# Patient Record
Sex: FEMALE | Race: OTHER | NOT HISPANIC OR LATINO | Employment: OTHER | URBAN - METROPOLITAN AREA
[De-identification: names, ages, dates, MRNs, and addresses within clinical notes are randomized per-mention and may not be internally consistent; named-entity substitution may affect disease eponyms.]

---

## 2017-09-01 DIAGNOSIS — Z12.31 ENCOUNTER FOR SCREENING MAMMOGRAM FOR MALIGNANT NEOPLASM OF BREAST: ICD-10-CM

## 2017-10-23 ENCOUNTER — HOSPITAL ENCOUNTER (OUTPATIENT)
Dept: MAMMOGRAPHY | Facility: HOSPITAL | Age: 77
Discharge: HOME | End: 2017-10-23
Attending: OBSTETRICS & GYNECOLOGY
Payer: MEDICARE

## 2017-10-23 DIAGNOSIS — Z12.31 ENCOUNTER FOR SCREENING MAMMOGRAM FOR MALIGNANT NEOPLASM OF BREAST: ICD-10-CM

## 2017-10-23 PROCEDURE — G0202 SCR MAMMO BI INCL CAD: HCPCS

## 2017-10-23 PROCEDURE — 77063 BREAST TOMOSYNTHESIS BI: CPT

## 2017-10-24 DIAGNOSIS — R92.8 OTHER ABNORMAL AND INCONCLUSIVE FINDINGS ON DIAGNOSTIC IMAGING OF BREAST: ICD-10-CM

## 2017-10-31 ENCOUNTER — HOSPITAL ENCOUNTER (OUTPATIENT)
Dept: ULTRASOUND IMAGING | Facility: CLINIC | Age: 77
Discharge: HOME | End: 2017-10-31
Payer: MEDICARE

## 2017-10-31 ENCOUNTER — HOSPITAL ENCOUNTER (OUTPATIENT)
Dept: MAMMOGRAPHY | Facility: CLINIC | Age: 77
Discharge: HOME | End: 2017-10-31
Payer: MEDICARE

## 2017-10-31 DIAGNOSIS — R92.8 OTHER ABNORMAL AND INCONCLUSIVE FINDINGS ON DIAGNOSTIC IMAGING OF BREAST: ICD-10-CM

## 2017-10-31 PROCEDURE — 76642 ULTRASOUND BREAST LIMITED: CPT

## 2018-03-14 ENCOUNTER — TRANSCRIBE ORDERS (OUTPATIENT)
Dept: ADMINISTRATIVE | Facility: HOSPITAL | Age: 78
End: 2018-03-14

## 2018-03-14 DIAGNOSIS — R20.0 NUMBNESS OF TOES: Primary | ICD-10-CM

## 2018-04-16 ENCOUNTER — HOSPITAL ENCOUNTER (OUTPATIENT)
Dept: RADIOLOGY | Facility: CLINIC | Age: 78
Discharge: HOME | End: 2018-04-16

## 2018-06-18 ENCOUNTER — HOSPITAL ENCOUNTER (OUTPATIENT)
Dept: RADIOLOGY | Facility: CLINIC | Age: 78
Discharge: HOME | End: 2018-06-18
Admitting: PHYSICAL MEDICINE & REHABILITATION
Payer: MEDICARE

## 2018-06-18 DIAGNOSIS — R20.0 NUMBNESS OF TOES: ICD-10-CM

## 2018-06-18 PROCEDURE — 95886 MUSC TEST DONE W/N TEST COMP: CPT

## 2018-06-18 PROCEDURE — 95911 NRV CNDJ TEST 9-10 STUDIES: CPT | Performed by: PHYSICAL MEDICINE & REHABILITATION

## 2018-06-18 PROCEDURE — 95886 MUSC TEST DONE W/N TEST COMP: CPT | Performed by: PHYSICAL MEDICINE & REHABILITATION

## 2018-06-18 NOTE — PROCEDURES
Procedures      Electromyogram and Nerve Conduction Velocity Procedure Note    HX:  79-year-old female with numbness in both feet referred by podiatry for electrodiagnostic study  She has a history of hyperglycemia but takes no medications  She also has a history of flat feet and has worn orthotics in the past   She also reports a history of plantar fasciitis  She does have some chronic back pain but describes no radicular symptoms  PMH:   Hyperglycemia no history of cancer no thyroid disorder  Exam:   Her Achilles reflexes are absent  Patellar reflexes are symmetric  There is no focal or lateralizing motor findings  There is no long tract signs, no fasciculations, tremors or atrophy  Tinel sign is unremarkable of both tarsal tunnels  There is mildly diminished sensation in the distal forefoot on both dorsal and plantar surface  Procedure:  Verbal informed consent was obtained as with all electrodiagnostic medicine patients  As with all patients this patient was informed that they may terminate the  examine at any time  Patient tolerated the procedure well with no adverse effects reported or observed  Findings:  Please see the [employer] data printout  Both peroneal motor responses were reduced amplitude  Both distal tibial motor latencies were prolonged and tarsal tunnel region right greater than left  Both medial plantar mixed responses were absent  Both sural sensory responses were present  The left sural sensory response was reduced in amplitude the right was normal     Both tibial H reflexes were obtained with significant relative slowing of the left side compared to the right  Electromyography:  monopolar needle exploration revealed mildly increased insertional activity in the form of trains of positive waves in the left lumbar paraspinal muscles only    No abnormalities found the following muscles:  Right lumbar paraspinals, bilateral gluteus medius, bilateral vastus lateralis, bilateral biceps femoris-long head, bilateral tibialis anterior, bilateral gastrocnemius  In all these muscles motor units were normal for amplitude duration and configuration  Recruitment pattern normal as well  Conclusion:   1  There is evidence for focal slowing of the distal tibial nerve branches in the tarsal tunnel with absent medial plantar sensory responses in the face of preserved sural sensory responses  This is most consistent with a bilateral tarsal tunnel syndrome which is moderate  2   There is also evidence for a very mild large fiber polyneuropathy which is not uncommon with hyperglycemic states  Other etiologies of course cannot be excluded entirely  The unilateral left tibial H-reflex prolongation is of uncertain significance  He can represent mild root irritation of the S1 nerve root but there is no evidence in the limb muscles to further define an  acute radiculopathy  Recommendations:     Careful clinical correlation is advised

## 2018-07-02 ENCOUNTER — ANNUAL EXAM (OUTPATIENT)
Dept: OBGYN CLINIC | Facility: CLINIC | Age: 78
End: 2018-07-02
Payer: MEDICARE

## 2018-07-02 VITALS
DIASTOLIC BLOOD PRESSURE: 70 MMHG | WEIGHT: 159 LBS | SYSTOLIC BLOOD PRESSURE: 122 MMHG | HEIGHT: 66 IN | BODY MASS INDEX: 25.55 KG/M2

## 2018-07-02 DIAGNOSIS — N95.1 MENOPAUSAL SYMPTOMS: ICD-10-CM

## 2018-07-02 DIAGNOSIS — Z01.419 GYNECOLOGIC EXAM NORMAL: Primary | ICD-10-CM

## 2018-07-02 DIAGNOSIS — Z12.39 SCREENING FOR MALIGNANT NEOPLASM OF BREAST: ICD-10-CM

## 2018-07-02 PROCEDURE — G0101 CA SCREEN;PELVIC/BREAST EXAM: HCPCS | Performed by: OBSTETRICS & GYNECOLOGY

## 2018-07-02 RX ORDER — UBIDECARENONE 10 MG
10 CAPSULE ORAL
COMMUNITY

## 2018-07-02 NOTE — PROGRESS NOTES
Grecia Fung is a 68 y o   female who presents for annual well woman exam  Periods are absent  No bleeding, spotting, or discharge  Patient reports Yes  hot flashes/night sweats, not sexually active at this time  had a mild stroke in 2017 and during his recovery he fell and broke his humerus and had to have his shoulder replaced, he is also awaiting knee replacement she has been taking care of him and helping him in doing a lot around the house and yd  Discussed reviewed for her to be cautious and consider getting some help as she has strain some muscles  , No vaginal dryness, sleeping poorly same as prior  Current contraception: post menopausal status  History of abnormal Pap smear: no  Family history of uterine or ovarian cancer: no  Regular self breast exam: yes  History of abnormal mammogram: yes -has stable cyst on ultrasound bilobed  Family history of breast cancer: yes -sister and paternal aunt    Menstrual History:  OB History      Para Term  AB Living    1 1       1    SAB TAB Ectopic Multiple Live Births                      Menarche age:   No LMP recorded (lmp unknown)  The following portions of the patient's history were reviewed and updated as appropriate: allergies, current medications, past family history, past medical history, past social history, past surgical history and problem list   Past Medical History:   Diagnosis Date    Arthritis     Fibrocystic breast     Hx of lithotripsy     Hyperthyroidism     Nocturia     Osteopenia     Osteoporosis      Past Surgical History:   Procedure Laterality Date    BREAST BIOPSY Right     FOOT SURGERY      LITHOTRIPSY      renal     OB History      Para Term  AB Living    1 1       1    SAB TAB Ectopic Multiple Live Births                       Review of Systems  Review of Systems   Constitutional: Positive for fatigue   Negative for chills, fever and unexpected weight change  HENT: Negative for dental problem, sinus pain and sinus pressure  Eyes: Negative for visual disturbance  Respiratory: Negative for cough, shortness of breath and wheezing  Cardiovascular: Negative for chest pain and leg swelling  Gastrointestinal: Negative for constipation, diarrhea, nausea and vomiting  Endocrine: Positive for heat intolerance  Genitourinary: Negative for menstrual problem, pelvic pain and urgency  Musculoskeletal: Positive for back pain  Arthritis     Allergic/Immunologic: Negative for environmental allergies  Neurological: Negative for dizziness and headaches  Objective      LMP  (LMP Unknown)    Vitals:    18 1415   BP: 122/70         General:   alert and oriented, in no acute distress, alert, appears stated age and cooperative   Heart: regular rate and rhythm, S1, S2 normal, no murmur, click, rub or gallop   Lungs: clear to auscultation bilaterally   Abdomen: soft, non-tender, without masses or organomegaly   Vulva: normal   Vagina: normal mucosa   Cervix: no cervical motion tenderness   Uterus: normal size, mobile, non-tender, Small   Adnexa: normal adnexa and no mass, fullness, tenderness   Breast inspection negative, no nipple discharge or bleeding, no masses or nodularity palpable  Rectal negative, stool guaiac negative  Pupils equal round reactive to light and accommodation  Throat clear no lesions or findings  Thyroid normal no masses or nodules       Assessment   68year-old  here for annual exam   Patient does not require Pap smears  Plan   Continue yearly mammogram, return in 1-2 years or sooner as needed

## 2018-08-21 ENCOUNTER — OFFICE VISIT (OUTPATIENT)
Dept: SURGERY | Facility: CLINIC | Age: 78
End: 2018-08-21
Payer: MEDICARE

## 2018-08-21 VITALS
HEIGHT: 66 IN | BODY MASS INDEX: 24.59 KG/M2 | RESPIRATION RATE: 13 BRPM | TEMPERATURE: 98.1 F | HEART RATE: 61 BPM | WEIGHT: 153 LBS | DIASTOLIC BLOOD PRESSURE: 64 MMHG | SYSTOLIC BLOOD PRESSURE: 132 MMHG

## 2018-08-21 DIAGNOSIS — D12.3 ADENOMATOUS POLYP OF TRANSVERSE COLON: Primary | ICD-10-CM

## 2018-08-21 PROCEDURE — 99203 OFFICE O/P NEW LOW 30 MIN: CPT | Performed by: PHYSICIAN ASSISTANT

## 2018-08-21 NOTE — PROGRESS NOTES
Jaylene Leo 1940 female MRN: [id number]        SUBJECTIVE    CC: Screening Colonoscopy (Colonoscopy consult- Last one done by Doc)      HPI:  Jaylene Leo is a 68 y o  female who presented for screening colonoscopy  This is going to be her 3rd colonoscopy  She is doing well, denies abdominal pain, n/v/ changes in bowel habits, fever or chills  She has never had an abdominal surgery  She denies Heart, lung or kidney disease  HPI    Review of Systems   Constitutional: Negative for activity change, chills and fever  Respiratory: Negative for cough and shortness of breath  Cardiovascular: Negative for chest pain and palpitations  Gastrointestinal: Negative for abdominal pain, blood in stool, constipation, diarrhea, nausea and vomiting  Genitourinary: Negative for difficulty urinating  Musculoskeletal: Negative for arthralgias  Neurological: Negative for headaches         Historical Information     The patient history was reviewed as follows:    Past Medical History:   Diagnosis Date    Arthritis     Fibrocystic breast     Hx of lithotripsy     Hyperthyroidism     Nocturia     Osteopenia     Osteoporosis      Past Surgical History:   Procedure Laterality Date    BREAST BIOPSY Right     FOOT SURGERY      LITHOTRIPSY      renal     Family History   Problem Relation Age of Onset    Cerebral aneurysm Mother     Breast cancer Sister     Cancer Brother         gastric,prostate    Breast cancer Paternal Aunt       Social History   History   Alcohol Use No     History   Drug Use No     History   Smoking Status    Never Smoker   Smokeless Tobacco    Never Used       Medications:   Meds/Allergies     Current Outpatient Prescriptions:     cholecalciferol (VITAMIN D3) 1,000 units tablet, Take 1,000 Units by mouth, Disp: , Rfl:     Coenzyme Q10 10 MG capsule, Take 10 mg by mouth, Disp: , Rfl:     Cyanocobalamin (VITAMIN B 12) 100 MCG LOZG, Take 1 tablet by mouth, Disp: , Rfl:    Efinaconazole (JUBLIA) 10 % SOLN, APPLY TO AFFECTED TOENAIL(S) BY TOPICAL ROUTE ONCE DAILY, Disp: , Rfl:     estradiol-norethindrone (COMBIPATCH) 0 05-0 14 MG/DAY, Place 1 patch on the skin 2 (two) times a week, Disp: 8 patch, Rfl: 3    Glucosamine-Chondroit-Calcium (TRIPLE FLEX BONE & JOINT PO), Take by mouth, Disp: , Rfl:     Multiple Vitamins-Minerals (MULTIVITAMIN ADULT EXTRA C PO), Take 1 tablet by mouth, Disp: , Rfl:     Omega-3 Fatty Acids (FISH OIL PO), Take 2 g by mouth, Disp: , Rfl:     Probiotic Product (PROBIOTIC-10 PO), Take by mouth, Disp: , Rfl:   Allergies   Allergen Reactions    Nitroglycerin Anaphylaxis       OBJECTIVE    Vitals:   Vitals:    08/21/18 1047   BP: 132/64   BP Location: Right arm   Patient Position: Sitting   Cuff Size: Standard   Pulse: 61   Resp: 13   Temp: 98 1 °F (36 7 °C)   TempSrc: Tympanic   Weight: 69 4 kg (153 lb)   Height: 5' 6" (1 676 m)     Wt Readings from Last 3 Encounters:   08/21/18 69 4 kg (153 lb)   07/02/18 72 1 kg (159 lb)   05/09/16 72 6 kg (160 lb)     Body mass index is 24 69 kg/m²  Temp Readings from Last 3 Encounters:   08/21/18 98 1 °F (36 7 °C) (Tympanic)     BP Readings from Last 3 Encounters:   08/21/18 132/64   07/02/18 122/70   05/09/16 140/70     Pulse Readings from Last 3 Encounters:   08/21/18 61     No LMP recorded  Physical Exam:    Physical Exam   Constitutional: She is oriented to person, place, and time  She appears well-developed and well-nourished  HENT:   Head: Normocephalic and atraumatic  Eyes: Conjunctivae are normal    Neck: Normal range of motion  Neck supple  Cardiovascular: Normal rate, regular rhythm, normal heart sounds and intact distal pulses  Exam reveals no gallop and no friction rub  No murmur heard  Pulmonary/Chest: Effort normal and breath sounds normal  No respiratory distress  She has no wheezes  She has no rales  Abdominal: Soft  Bowel sounds are normal  She exhibits no distension   There is no tenderness  There is no rebound  Musculoskeletal: Normal range of motion  Neurological: She is alert and oriented to person, place, and time  Skin: Skin is warm and dry  Psychiatric: She has a normal mood and affect  Vitals reviewed  Labs: I have personally reviewed all pertinent results  No visits with results within 3 Month(s) from this visit  Latest known visit with results is:   No results found for any previous visit  No results found for any previous visit  Imaging:  I have personally reviewed all pertinent results  Assessment/Plan   Patient here for screening colonoscopy      - PCP: Natasha Li MD  - Follow-up appointments:     Future Appointments  Date Time Provider Manan Lopez   11/5/2018 10:00 AM AN MAMMO 1 HEAVEN Uriarte MD, PGY-1  [facility]   8/21/2018

## 2018-08-21 NOTE — LETTER
August 21, 2018     Toan Bravo MD  [address] [place] [address]    Patient: Rohit Nowak   YOB: 1940   Date of Visit: 8/21/2018       Dear Dr Jeremy Perez:    Thank you for referring Shannon Perez to me for evaluation  Below are my notes for this consultation  If you have questions, please do not hesitate to call me  I look forward to following your patient along with you  Sincerely,        Madelaine Prieto PA-C        CC: No Recipients  Theresa Pardo MD  8/21/2018 12:06 PM  Attested  Rohit Nowak 1940 female MRN: [id number]        SUBJECTIVE    CC: Screening Colonoscopy (Colonoscopy consult- Last one done by Doc)      HPI:  Rohit Nowak is a 68 y o  female who presented for screening colonoscopy  This is going to be her 3rd colonoscopy  She is doing well, denies abdominal pain, n/v/ changes in bowel habits, fever or chills  She has never had an abdominal surgery  She denies Heart, lung or kidney disease  HPI    Review of Systems   Constitutional: Negative for activity change, chills and fever  Respiratory: Negative for cough and shortness of breath  Cardiovascular: Negative for chest pain and palpitations  Gastrointestinal: Negative for abdominal pain, blood in stool, constipation, diarrhea, nausea and vomiting  Genitourinary: Negative for difficulty urinating  Musculoskeletal: Negative for arthralgias  Neurological: Negative for headaches         Historical Information     The patient history was reviewed as follows:    Past Medical History:   Diagnosis Date    Arthritis     Fibrocystic breast     Hx of lithotripsy     Hyperthyroidism     Nocturia     Osteopenia     Osteoporosis      Past Surgical History:   Procedure Laterality Date    BREAST BIOPSY Right     FOOT SURGERY      LITHOTRIPSY      renal     Family History   Problem Relation Age of Onset    Cerebral aneurysm Mother     Breast cancer Sister     Cancer Brother         gastric,prostate    Breast cancer Paternal Aunt       Social History   History   Alcohol Use No     History   Drug Use No     History   Smoking Status    Never Smoker   Smokeless Tobacco    Never Used       Medications:   Meds/Allergies     Current Outpatient Prescriptions:     cholecalciferol (VITAMIN D3) 1,000 units tablet, Take 1,000 Units by mouth, Disp: , Rfl:     Coenzyme Q10 10 MG capsule, Take 10 mg by mouth, Disp: , Rfl:     Cyanocobalamin (VITAMIN B 12) 100 MCG LOZG, Take 1 tablet by mouth, Disp: , Rfl:     Efinaconazole (JUBLIA) 10 % SOLN, APPLY TO AFFECTED TOENAIL(S) BY TOPICAL ROUTE ONCE DAILY, Disp: , Rfl:     estradiol-norethindrone (COMBIPATCH) 0 05-0 14 MG/DAY, Place 1 patch on the skin 2 (two) times a week, Disp: 8 patch, Rfl: 3    Glucosamine-Chondroit-Calcium (TRIPLE FLEX BONE & JOINT PO), Take by mouth, Disp: , Rfl:     Multiple Vitamins-Minerals (MULTIVITAMIN ADULT EXTRA C PO), Take 1 tablet by mouth, Disp: , Rfl:     Omega-3 Fatty Acids (FISH OIL PO), Take 2 g by mouth, Disp: , Rfl:     Probiotic Product (PROBIOTIC-10 PO), Take by mouth, Disp: , Rfl:   Allergies   Allergen Reactions    Nitroglycerin Anaphylaxis       OBJECTIVE    Vitals:   Vitals:    08/21/18 1047   BP: 132/64   BP Location: Right arm   Patient Position: Sitting   Cuff Size: Standard   Pulse: 61   Resp: 13   Temp: 98 1 °F (36 7 °C)   TempSrc: Tympanic   Weight: 69 4 kg (153 lb)   Height: 5' 6" (1 676 m)     Wt Readings from Last 3 Encounters:   08/21/18 69 4 kg (153 lb)   07/02/18 72 1 kg (159 lb)   05/09/16 72 6 kg (160 lb)     Body mass index is 24 69 kg/m²  Temp Readings from Last 3 Encounters:   08/21/18 98 1 °F (36 7 °C) (Tympanic)     BP Readings from Last 3 Encounters:   08/21/18 132/64   07/02/18 122/70   05/09/16 140/70     Pulse Readings from Last 3 Encounters:   08/21/18 61     No LMP recorded      Physical Exam:    Physical Exam   Constitutional: She is oriented to person, place, and time  She appears well-developed and well-nourished  HENT:   Head: Normocephalic and atraumatic  Eyes: Conjunctivae are normal    Neck: Normal range of motion  Neck supple  Cardiovascular: Normal rate, regular rhythm, normal heart sounds and intact distal pulses  Exam reveals no gallop and no friction rub  No murmur heard  Pulmonary/Chest: Effort normal and breath sounds normal  No respiratory distress  She has no wheezes  She has no rales  Abdominal: Soft  Bowel sounds are normal  She exhibits no distension  There is no tenderness  There is no rebound  Musculoskeletal: Normal range of motion  Neurological: She is alert and oriented to person, place, and time  Skin: Skin is warm and dry  Psychiatric: She has a normal mood and affect  Vitals reviewed  Labs: I have personally reviewed all pertinent results  No visits with results within 3 Month(s) from this visit  Latest known visit with results is:   No results found for any previous visit  No results found for any previous visit  Imaging:  I have personally reviewed all pertinent results  Assessment/Plan   Patient here for screening colonoscopy  - PCP: Yun De La Fuente MD  - Follow-up appointments:     Future Appointments  Date Time Provider Manan Jesscia   11/5/2018 10:00 AM AN MAMMO 1 AN Karen Uriarte MD, PGY-1  [facility]   8/21/2018    Attestation signed by Anastasio Curling, PA-C at 8/21/2018 12:06 PM:  patient seen, interviewed and examined with resident    59-year-old female presents for surveillance colonoscopy  Patient had a colonoscopy in 2013 that was reported as normal and prior colonoscopy by Dr Jimbo Prajapati in 2010 with the tubular adenoma polyp    No family history of colon cancer    No denies abdominal pain, change in bowel habits or blood in stool    On physical exam  VSS  Heart:  Regular rate rhythm  Lungs:  Clear to auscultation, bilaterally   No rhonchi, wheezes or rales  Abdomen:  Soft, nontender, nondistended, positive bowel sounds  Extremities:  No cyanosis ,clubbing or edema    Assessment/ plan:  Colonoscopy for history of colon polyps and screening for colon cancer

## 2018-08-23 PROBLEM — Z86.010 HISTORY OF COLONIC POLYPS: Status: ACTIVE | Noted: 2018-08-23

## 2018-08-23 PROBLEM — Z86.0100 HISTORY OF COLONIC POLYPS: Status: ACTIVE | Noted: 2018-08-23

## 2018-09-17 ENCOUNTER — ANESTHESIA EVENT (OUTPATIENT)
Dept: GASTROENTEROLOGY | Facility: HOSPITAL | Age: 78
End: 2018-09-17
Payer: MEDICARE

## 2018-09-18 ENCOUNTER — ANESTHESIA (OUTPATIENT)
Dept: GASTROENTEROLOGY | Facility: HOSPITAL | Age: 78
End: 2018-09-18
Payer: MEDICARE

## 2018-09-18 ENCOUNTER — HOSPITAL ENCOUNTER (OUTPATIENT)
Facility: HOSPITAL | Age: 78
Setting detail: OUTPATIENT SURGERY
Discharge: HOME | End: 2018-09-18
Attending: SURGERY | Admitting: SURGERY
Payer: MEDICARE

## 2018-09-18 VITALS
HEIGHT: 66 IN | DIASTOLIC BLOOD PRESSURE: 54 MMHG | SYSTOLIC BLOOD PRESSURE: 108 MMHG | HEART RATE: 68 BPM | BODY MASS INDEX: 24.59 KG/M2 | OXYGEN SATURATION: 98 % | RESPIRATION RATE: 20 BRPM | WEIGHT: 153 LBS | TEMPERATURE: 99.1 F

## 2018-09-18 DIAGNOSIS — Z86.010 HISTORY OF COLONIC POLYPS: ICD-10-CM

## 2018-09-18 PROCEDURE — 88305 TISSUE EXAM BY PATHOLOGIST: CPT | Performed by: PATHOLOGY

## 2018-09-18 PROCEDURE — 45385 COLONOSCOPY W/LESION REMOVAL: CPT | Performed by: SURGERY

## 2018-09-18 PROCEDURE — 45384 COLONOSCOPY W/LESION REMOVAL: CPT | Performed by: SURGERY

## 2018-09-18 RX ORDER — LIDOCAINE HYDROCHLORIDE 10 MG/ML
INJECTION, SOLUTION INFILTRATION; PERINEURAL AS NEEDED
Status: DISCONTINUED | OUTPATIENT
Start: 2018-09-18 | End: 2018-09-18 | Stop reason: SURG

## 2018-09-18 RX ORDER — SODIUM CHLORIDE 9 MG/ML
125 INJECTION, SOLUTION INTRAVENOUS CONTINUOUS
Status: DISCONTINUED | OUTPATIENT
Start: 2018-09-18 | End: 2018-09-18 | Stop reason: HOSPADM

## 2018-09-18 RX ORDER — PROPOFOL 10 MG/ML
INJECTION, EMULSION INTRAVENOUS AS NEEDED
Status: DISCONTINUED | OUTPATIENT
Start: 2018-09-18 | End: 2018-09-18 | Stop reason: SURG

## 2018-09-18 RX ADMIN — PROPOFOL 50 MG: 10 INJECTION, EMULSION INTRAVENOUS at 07:40

## 2018-09-18 RX ADMIN — PROPOFOL 50 MG: 10 INJECTION, EMULSION INTRAVENOUS at 07:46

## 2018-09-18 RX ADMIN — LIDOCAINE HYDROCHLORIDE 30 MG: 10 INJECTION, SOLUTION INFILTRATION; PERINEURAL at 07:30

## 2018-09-18 RX ADMIN — PROPOFOL 50 MG: 10 INJECTION, EMULSION INTRAVENOUS at 07:30

## 2018-09-18 RX ADMIN — PROPOFOL 50 MG: 10 INJECTION, EMULSION INTRAVENOUS at 07:36

## 2018-09-18 RX ADMIN — SODIUM CHLORIDE 125 ML/HR: 0.9 INJECTION, SOLUTION INTRAVENOUS at 07:14

## 2018-09-18 NOTE — DISCHARGE INSTRUCTIONS
[facility]  Endoscopy Post-Operative Instructions  Dr Mike Kirby MD, FACS    Procedure: Colonoscopy    Findings:  Diverticulosis and Colon Polyp(s)    Follow-Up: You will need a repeat Endoscopy in (generally)5 years  Will await final pathology report for final determination of number of years until your follow up endoscopy, if you had polyps on this exam   Different types of polyps require different lengths of follow up surveillance  Please call our office or your primary doctor's office if you have any questions, once the report is returned  You should have an endoscopy sooner than recommended if you have any symptoms of bleeding or change in stools or other concerns  You will receive a call from our office with your results, in addition to the the preliminary results you received today  You will usually receive a follow-up letter from our office in 1-2 weeks  Call the office if you do not hear from us  You are welcome to also schedule an office visit if desired to discuss the results further  It is your responsibility to contact our office for results in 1- 2 weeks if you do not hear from us  If a follow up endoscopy is needed, you are responsible for arranging that follow up appointment at the appropriate time  The office may or may not issue a reminder at that future time  Please take responsibility for your own follow up healthcare  Diet: Eat a light snack first, and then resume your previous diet  Discharge Medications:  See after visit summary for reconciled discharge medications provided to patient and family  Current Facility-Administered Medications:     sodium chloride 0 9 % infusion, 125 mL/hr, Intravenous, Continuous, Dwayne Wilson DO, Last Rate: 125 mL/hr at 09/18/18 0714, 125 mL/hr at 09/18/18 0714  Do not take aspirin or blood thinning medications for 2 days following procedure  Tylenol is okay      You may have been given a new medication  Please take this (usually an anti-ulcer -type medication) and see your primary care doctor for refills and follow up medications if needed       After the test: Notify physician for arm swelling or pain at the intravenous site  You may have some abdominal discomfort following the procedure  Belching or passing flatus will help relieve it  Following upper endoscopy, you may experience a temporary sore throat  Saline gargles or lozenges can be taken to relieve sore throat Following lower endoscopy, minor rectal bleeding is not uncommon      Activity: Do not drive a car, operate machinery, or sign legal documents for 24 hours after your procedure  Normal activity may be resumed on the day following the procedure      Call the office at [phone] for any of the following: Severe abdominal pain, significant rectal bleeding, chills, or fever above 100°, new onset of persistent cough or persistent vomiting      [address], [address]   Phone: [phone]                        Colorectal Polyps   WHAT YOU NEED TO KNOW:   Colorectal polyps are small growths of tissue in the lining of the colon and rectum  Most polyps are hyperplastic polyps and are usually benign (noncancerous)  Certain types of polyps, called adenomatous polyps, may turn into cancer  DISCHARGE INSTRUCTIONS:   Follow up with your healthcare provider or gastroenterologist as directed: You may need to return for more tests, such as another colonoscopy  Write down your questions so you remember to ask them during your visits  Reduce your risk for colorectal polyps:   · Eat a variety of healthy foods:  Healthy foods include fruit, vegetables, whole-grain breads, low-fat dairy products, beans, lean meat, and fish  Ask if you need to be on a special diet  · Maintain a healthy weight:  Ask your healthcare provider if you need to lose weight and how much you need to lose   Ask for help with a weight loss program     · Exercise:  Begin to exercise slowly and do more as you get stronger  Talk with your healthcare provider before you start an exercise program      · Limit alcohol:  Your risk for polyps increases the more you drink  · Do not smoke: If you smoke, it is never too late to quit  Ask for information about how to stop  For support and more information:   · [address] ([facility])  [address] [place], [place] [address]  Phone: [phone]  Web Address: www digestive  niddk nih gov  Contact your healthcare provider or gastroenterologist if:   · You have a fever  · You have chills, a cough, or feel weak and achy  · You have abdominal pain that does not go away or gets worse after you take medicine  · Your abdomen is swollen  · You are losing weight without trying  · You have questions or concerns about your condition or care  Seek care immediately or call 911 if:   · You have sudden shortness of breath  · You have a fast heart rate, fast breathing, or are too dizzy to stand up  · You have severe abdominal pain  · You see blood in your bowel movement  © 2017 [address]  Information is for End User's use only and may not be sold, redistributed or otherwise used for commercial purposes  All illustrations and images included in CareNotes® are the copyrighted property of [employer]  or [address]  The above information is an  only  It is not intended as medical advice for individual conditions or treatments  Talk to your doctor, nurse or pharmacist before following any medical regimen to see if it is safe and effective for you  Colonoscopy   WHAT YOU NEED TO KNOW:   A colonoscopy is a procedure to examine the inside of your colon (intestine) with a scope  Polyps or tissue growths may have been removed during your colonoscopy   It is normal to feel bloated and to have some abdominal discomfort  You should be passing gas  If you have hemorrhoids or you had polyps removed, you may have a small amount of bleeding  DISCHARGE INSTRUCTIONS:   Seek care immediately if:   · You have a large amount of bright red blood in your bowel movements  · Your abdomen is hard and firm and you have severe pain  · You have sudden trouble breathing  Contact your healthcare provider if:   · You develop a rash or hives  · You have a fever within 24 hours of your procedure  · You have not had a bowel movement for 3 days after your procedure  · You have questions or concerns about your condition or care  Activity:   · Do not lift, strain, or run  for 3 days after your procedure  · Rest after your procedure  You have been given medicine to relax you  Do not  drive or make important decisions until the day after your procedure  Return to your normal activity as directed  · Relieve gas and discomfort from bloating  by lying on your right side with a heating pad on your abdomen  You may need to take short walks to help the gas move out  Eat small meals until bloating is relieved  If you had polyps removed: For 7 days after your procedure:  · Do not  take aspirin  · Do not  go on long car rides  Help prevent constipation:   · Eat a variety of healthy foods  Healthy foods include fruit, vegetables, whole-grain breads, low-fat dairy products, beans, lean meat, and fish  Ask if you need to be on a special diet  Your healthcare provider may recommend that you eat high-fiber foods such as cooked beans  Fiber helps you have regular bowel movements  · Drink liquids as directed  Adults should drink between 9 and 13 eight-ounce cups of liquid every day  Ask what amount is best for you  For most people, good liquids to drink are water, juice, and milk  · Exercise as directed  Talk to your healthcare provider about the best exercise plan for you   Exercise can help prevent constipation, decrease your blood pressure and improve your health  Follow up with your healthcare provider as directed:  Write down your questions so you remember to ask them during your visits  © 2017 [address] [employer] Information is for End User's use only and may not be sold, redistributed or otherwise used for commercial purposes  All illustrations and images included in CareNotes® are the copyrighted property of [employer]  or [employer]  The above information is an  only  It is not intended as medical advice for individual conditions or treatments  Talk to your doctor, nurse or pharmacist before following any medical regimen to see if it is safe and effective for you  Diverticulosis   WHAT YOU NEED TO KNOW:   Diverticulosis is a condition that causes small pockets called diverticula to form in your intestine  These pockets make it difficult for bowel movements to pass through your digestive system  DISCHARGE INSTRUCTIONS:   Seek care immediately if:   · You have severe pain on the left side of your lower abdomen  · Your bowel movements are bright or dark red  Contact your healthcare provider if:   · You have a fever and chills  · You feel dizzy or lightheaded  · You have nausea, or you are vomiting  · You have a change in your bowel movements  · You have questions or concerns about your condition or care  Medicines:   · Medicines  to soften your bowel movements may be given  You may also need medicines to treat symptoms such as bloating and pain  · Take your medicine as directed  Contact your healthcare provider if you think your medicine is not helping or if you have side effects  Tell him or her if you are allergic to any medicine  Keep a list of the medicines, vitamins, and herbs you take  Include the amounts, and when and why you take them  Bring the list or the pill bottles to follow-up visits   Carry your medicine list with you in case of an emergency  Self-care: The goal of treatment is to manage any symptoms you have and prevent other problems such as diverticulitis  Diverticulitis is swelling or infection of the diverticula  Your healthcare provider may recommend any of the following:  · Eat a variety of high-fiber foods  High-fiber foods help you have regular bowel movements  High-fiber foods include cooked beans, fruits, vegetables, and some cereals  Most adults need 25 to 35 grams of fiber each day  Your healthcare provider may recommend that you have more  Ask your healthcare provider how much fiber you need  Increase fiber slowly  You may have abdominal discomfort, bloating, and gas if you add fiber to your diet too quickly  You may need to take a fiber supplement if you are not getting enough fiber from food  · Drink liquids as directed  You may need to drink 2 to 3 liters (8 to 12 cups) of liquids every day  Ask your healthcare provider how much liquid to drink each day and which liquids are best for you  · Apply heat  on your abdomen for 20 to 30 minutes every 2 hours for as many days as directed  Heat helps decrease pain and muscle spasms  Help prevent diverticulitis or other symptoms: The following may help decrease your risk for diverticulitis or symptoms, such as bleeding  Talk to your provider about these or other things you can do to prevent problems that may occur with diverticulosis  · Exercise regularly  Ask your healthcare provider about the best exercise plan for you  Exercise can help you have regular bowel movements  Get 30 minutes of exercise on most days of the week  · Maintain a healthy weight  Ask your healthcare provider how much you should weigh  Ask him or her to help you create a weight loss plan if you are overweight  · Do not smoke  Nicotine and other chemicals in cigarettes increase your risk for diverticulitis   Ask your healthcare provider for information if you currently smoke and need help to quit  E-cigarettes or smokeless tobacco still contain nicotine  Talk to your healthcare provider before you use these products  · Ask your healthcare provider if it is safe to take NSAIDs  NSAIDs may increase your risk of diverticulitis  Follow up with your healthcare provider as directed:  Write down your questions so you remember to ask them during your visits  © 2017 [address] [employer] Information is for End User's use only and may not be sold, redistributed or otherwise used for commercial purposes  All illustrations and images included in CareNotes® are the copyrighted property of [employer]  or [employer]  The above information is an  only  It is not intended as medical advice for individual conditions or treatments  Talk to your doctor, nurse or pharmacist before following any medical regimen to see if it is safe and effective for you  Diverticulosis Diet   WHAT YOU NEED TO KNOW:   What is a diverticulosis diet? A diverticulosis diet includes high-fiber foods  High-fiber foods help you have regular bowel movements  Extra fiber may decrease your risk of forming new diverticula (small pockets) in your intestine  A high-fiber diet may also help prevent diverticulitis  Diverticulitis is a painful condition that occurs when diverticula become inflamed or infected  You do not need to avoid nuts, seeds, corn, or popcorn while you are on a diverticulosis diet  How much fiber do I need? You may need 25 to 35 grams of fiber each day  Ask your dietitian or healthcare provider how much fiber you should have  Increase your intake of fiber slowly  When you eat more fiber, you may have gas and feel bloated  You may need to take a fiber supplement if you do not get enough fiber from food  Drink plenty of liquids as you increase the fiber in your diet   Your dietitian or healthcare provider may recommend 8 eight-ounce cups or more each day  Ask which liquids are best for you  Which foods are high in fiber? · Foods with at least 4 grams of fiber per serving:      ¨ ? to ½ cup of high-fiber cereal (check the nutrition label on the box)    ¨ ½ cup of blackberries or raspberries    ¨ 4 dried prunes    ¨ 1 cooked artichoke    ¨ ½ cup of cooked legumes, such as lentils, or red, kidney, and lerma beans    · Foods with 1 to 3 grams of fiber per serving:      ¨ 1 slice of whole-wheat, pumpernickel, or rye bread    ¨ 4 whole-wheat crackers    ¨ ½ cup of cereal with 1 to 3 grams of fiber per serving (check the nutrition label on the box)    ¨ 1 piece of fruit, such as an apple, banana, pear, kiwi, or orange    ¨ 3 dates    ¨ ½ cup of canned apricots, fruit cocktail, peaches, or pears    ¨ ½ cup of raw or cooked vegetables, such as carrots, cauliflower, cabbage, spinach, squash, or corn  When should I contact my healthcare provider? · You have questions about a high-fiber diet  · You have a change in your bowel movements  · You have an upset stomach  · You have a fever  · You have pain in your lower abdomen on the left side  · You have questions about your condition or care  CARE AGREEMENT:   You have the right to help plan your care  Learn about your health condition and how it may be treated  Discuss treatment options with your caregivers to decide what care you want to receive  You always have the right to refuse treatment  The above information is an  only  It is not intended as medical advice for individual conditions or treatments  Talk to your doctor, nurse or pharmacist before following any medical regimen to see if it is safe and effective for you  © 2017 [address]  Information is for End User's use only and may not be sold, redistributed or otherwise used for commercial purposes   All illustrations and images included in CareNotes® are the copyrighted property of [employer]  or [facility] [employer]  Hemorrhoids   WHAT YOU NEED TO KNOW:   Hemorrhoids are swollen blood vessels inside your rectum (internal hemorrhoids) or on your anus (external hemorrhoids)  Sometimes a hemorrhoid may prolapse  This means it extends out of your anus  DISCHARGE INSTRUCTIONS:   Seek care immediately if:   · You have severe pain in your rectum or around your anus  · You have severe pain in your abdomen and you are vomiting  · You have bleeding from your anus that soaks through your underwear  Contact your healthcare provider if:   · You have frequent and painful bowel movements  · Your hemorrhoid looks or feels more swollen than usual      · You do not have a bowel movement for 2 days or more  · You see or feel tissue coming through your anus  · You have questions or concerns about your condition or care  Medicines: You may  need any of the following:  · Medicine  may be given to decrease pain, swelling, and itching  The medicine may come as a pad, cream, or ointment  · Stool softeners  help treat or prevent constipation  · NSAIDs , such as ibuprofen, help decrease swelling, pain, and fever  NSAIDs can cause stomach bleeding or kidney problems in certain people  If you take blood thinner medicine, always ask your healthcare provider if NSAIDs are safe for you  Always read the medicine label and follow directions  · Take your medicine as directed  Contact your healthcare provider if you think your medicine is not helping or if you have side effects  Tell him or her if you are allergic to any medicine  Keep a list of the medicines, vitamins, and herbs you take  Include the amounts, and when and why you take them  Bring the list or the pill bottles to follow-up visits  Carry your medicine list with you in case of an emergency  Manage your symptoms:   · Apply ice on your anus for 15 to 20 minutes every hour or as directed  Use an ice pack, or put crushed ice in a plastic bag  Cover it with a towel before you apply it to your anus  Ice helps prevent tissue damage and decreases swelling and pain  · Take a sitz bath  Fill a bathtub with 4 to 6 inches of warm water  You may also use a sitz bath pan that fits inside a toilet bowl  Sit in the sitz bath for 15 minutes  Do this 3 times a day, and after each bowel movement  The warm water can help decrease pain and swelling  · Keep your anal area clean  Gently wash the area with warm water daily  Soap may irritate the area  After a bowel movement, wipe with moist towelettes or wet toilet paper  Dry toilet paper can irritate the area  Prevent hemorrhoids:   · Do not strain to have a bowel movement  Do not sit on the toilet too long  These actions can increase pressure on the tissues in your rectum and anus  · Drink plenty of liquids  Liquids can help prevent constipation  Ask how much liquid to drink each day and which liquids are best for you  · Eat a variety of high-fiber foods  Examples include fruits, vegetables, and whole grains  Ask your healthcare provider how much fiber you need each day  You may need to take a fiber supplement  · Exercise as directed  Exercise, such as walking, may make it easier to have a bowel movement  Ask your healthcare provider to help you create an exercise plan  · Do not have anal sex  Anal sex can weaken the skin around your rectum and anus  · Avoid heavy lifting  This can cause straining and increase your risk for another hemorrhoid  Follow up with your healthcare provider as directed:  Write down your questions so you remember to ask them during your visits  © 2017 [facility] [facility] Information is for End User's use only and may not be sold, redistributed or otherwise used for commercial purposes  All illustrations and images included in CareNotes® are the copyrighted property of [employer]  or [employer]    The above information is an  only  It is not intended as medical advice for individual conditions or treatments  Talk to your doctor, nurse or pharmacist before following any medical regimen to see if it is safe and effective for you

## 2018-09-18 NOTE — DISCHARGE SUMMARY
Discharge Summary - Josemanuel Potter 68 y o  female MRN: [id number]    Unit/Bed#: ENDO POOL Encounter: [id number]      Pre-Operative Diagnosis: Pre-Op Diagnosis Codes:     * History of colonic polyps [Z86 010]    Post-Operative Diagnosis: Post-Op Diagnosis Codes:     * History of colonic polyps [Z86 010]     * Adenomatous polyps [D36 9]     * Diverticulosis [K57 90]    Procedures Performed:  Procedure(s):  COLONOSCOPY    Surgeon: Corina Eisenmenger, MD    See H & P for full details of admission and Operative Note for full details of operations performed  Patient was seen and examined prior to discharge  Provisions for Follow-Up Care:  See After Visit Summary for information related to follow-up care and home orders  Disposition: Home, in stable condition  Planned Readmission: No    Discharge Medications:  See after visit summary for reconciled discharge medications provided to patient and family  Post Operative instructions: Reviewed with patient and/or family  Some portions of this record may have been generated with voice recognition software  There may be translation, syntax,  or grammatical errors  Occasional wrong word or "sound-a-like" substitutions may have occurred due to the inherent limitations of the voice recognition software  Read the chart carefully and recognize, using context, where substitutions may have occurred  If you have any questions, please contact the dictating provider for clarification or correction, as needed  This encounter has been coded by non certified Coder      Signature:   Corina Eisenmenger, MD  Date: 9/18/2018 Time: 7:54 AM

## 2018-09-18 NOTE — OP NOTE
COLONOSCOPY  Postoperative Note  PATIENT NAME: Wilder Mosley  : 1940  MRN: [id number]  [facility]    Surgery Date: 2018      Pre operative diagnosis:   History of colonic polyps [Z86 010]    Operative Indications:  Due for Colonoscopy    Previous Colonoscopy and  Location of polyps if any:   5 years ago and  with polyps in the :   unknown             Consent:  The risks, benefits, and alternatives to the surgery were discussed with the patient and with the family prior to surgery if available, personally by Dr Chavo Pollard  If the consent was obtained by the physician assistant or other representative, the consent was reviewed once again personally by the operating physician  Common complications particular for this procedure as well as unusual complications were discussed, including but not limited to:  bleeding, wound infection, prolonged wound healing, open wounds, reoperation, leak from the bowel or viscus, leak from the bile duct or injury to adjacent or other organs or blood vessels in the abdomen, and possible surgery or reoperation  A  was used if necessary  The patient expressed understanding of the issues discussed and wished and consented to the procedure to proceed  All questions were answered  Dr Chavo Pollard personally discussed the informed consent with this patient  Post-Operative Diagnosis and Findings:     Diverticulosis and Hemorrhoids     Multiple polyp(s) seen in Right Colon and Transverse Colon      Procedure:    Procedure(s):  COLONOSCOPY with Cold forcep polypectomy and Hot snare polypectomy      Surgeon(s) and Role:     * Manav Shahid MD - Primary    I was present for the entire procedure  Specimens:  ID Type Source Tests Collected by Time Destination   1 : 4mm polyp Tissue Large Intestine, Transverse Colon TISSUE EXAM Manav Shahid MD 2018 0750        Estimated Blood Loss:   0 mL    Anesthesia Type:   Choice     Procedure:   The patient was seen in the Holding Room  The risks, benefits, complications, treatment options, and expected outcomes were discussed with the patient  The possibilities of reaction to medication, pulmonary aspiration, perforation of viscus, bleeding, recurrent infection, the need for additional procedures, failure to diagnose a condition, missed polyps, a complication requiring transfusion or operation were discussed with the patient  The patient concurred with the proposed plan, giving informed consent  The patient was taken to Endoscopy Suite, identified as [facility]  Staff confirmed patient name, , and procedure  A Time Out was held and the above information confirmed  A visual and digital exam was carried out of the anus and anal canal   Findings were normal unless specified below  The colonoscope was passed per anus and traversed to the cecum  The cecum was clearly visualized in the right lower quadrant by light reflex and palpation  The scope was withdrawn  There were mucosal lesion(s) and/or polyp(s) seen in the colon  These polyps were located at: ascending colon and transverse colon  The polyp(s) were addressed using polypectomy technique described above  There were diverticula scattered throughout the sigmoid colon and grade 1 and 2 hemorrhoids  Unfortunate the larger polyp measuring 1 cm was on retrieved despite multiple efforts to retrieve  This was not sent for pathology but the 2nd was  The 1st polyp was clearly consistent with a tubular adenoma of moderate size  A photograph was taken  The scope was retroflexed  There were no anorectal lesions other than the hemorrhoids  The scope was removed  The patient tolerated the procedure well  Withdrawal time was greater than 8 minutes  Prep was good  at a 4/5  Follow-up endoscopy: Follow-up colonoscopy timing is difficult to predict without pathology and is not an exact science   Patients are told to follow up earlier than recommended if they have any symptoms or GI changes  However it is required that we predict possible endoscopy follow-up at the time of this procedure  Follow-up endoscopy is estimated to be recommended in:   5 years for surveillence for polyps       Some portions of this record may have been generated with voice recognition software  There may be translation, syntax,  or grammatical errors  Occasional wrong word or "sound-a-like" substitutions may have occurred due to the inherent limitations of the voice recognition software  Read the chart carefully and recognize, using context, where substations may have occurred  If you have any questions, please contact the dictating provider for clarification or correction, as needed         Complications: None    Condition: Stable to PACU    SIGNATURE: Laura Doss MD   DATE: September 18, 2018   TIME: 7:53 AM

## 2018-09-18 NOTE — ANESTHESIA PREPROCEDURE EVALUATION
Review of Systems/Medical History  Patient summary reviewed        Cardiovascular  Negative cardio ROS    Pulmonary  Negative pulmonary ROS        GI/Hepatic  Negative GI/hepatic ROS          Negative  ROS        Endo/Other  History of thyroid disease , hyperthyroidism,      GYN  Negative gynecology ROS          Hematology  Negative hematology ROS      Musculoskeletal    Comment: osteopenia Arthritis     Neurology  Negative neurology ROS      Psychology   Negative psychology ROS              Physical Exam    Airway    Mallampati score: II  TM Distance: >3 FB  Neck ROM: full     Dental   No notable dental hx     Cardiovascular  Comment: Negative ROS, Rhythm: regular, Rate: normal,     Pulmonary  Breath sounds clear to auscultation,     Other Findings        Anesthesia Plan  ASA Score- 2     Anesthesia Type- IV sedation with anesthesia with ASA Monitors  Additional Monitors:   Airway Plan:         Plan Factors-Patient not instructed to abstain from smoking on day of procedure  Patient did not smoke on day of surgery  Induction- intravenous  Postoperative Plan-     Informed Consent- Anesthetic plan and risks discussed with patient

## 2018-09-18 NOTE — H&P (VIEW-ONLY)
Brandon Quiros 1940 female MRN: [id number]        SUBJECTIVE    CC: Screening Colonoscopy (Colonoscopy consult- Last one done by Doc)      HPI:  Brandon Quiros is a 68 y o  female who presented for screening colonoscopy  This is going to be her 3rd colonoscopy  She is doing well, denies abdominal pain, n/v/ changes in bowel habits, fever or chills  She has never had an abdominal surgery  She denies Heart, lung or kidney disease  HPI    Review of Systems   Constitutional: Negative for activity change, chills and fever  Respiratory: Negative for cough and shortness of breath  Cardiovascular: Negative for chest pain and palpitations  Gastrointestinal: Negative for abdominal pain, blood in stool, constipation, diarrhea, nausea and vomiting  Genitourinary: Negative for difficulty urinating  Musculoskeletal: Negative for arthralgias  Neurological: Negative for headaches         Historical Information     The patient history was reviewed as follows:    Past Medical History:   Diagnosis Date    Arthritis     Fibrocystic breast     Hx of lithotripsy     Hyperthyroidism     Nocturia     Osteopenia     Osteoporosis      Past Surgical History:   Procedure Laterality Date    BREAST BIOPSY Right     FOOT SURGERY      LITHOTRIPSY      renal     Family History   Problem Relation Age of Onset    Cerebral aneurysm Mother     Breast cancer Sister     Cancer Brother         gastric,prostate    Breast cancer Paternal Aunt       Social History   History   Alcohol Use No     History   Drug Use No     History   Smoking Status    Never Smoker   Smokeless Tobacco    Never Used       Medications:   Meds/Allergies     Current Outpatient Prescriptions:     cholecalciferol (VITAMIN D3) 1,000 units tablet, Take 1,000 Units by mouth, Disp: , Rfl:     Coenzyme Q10 10 MG capsule, Take 10 mg by mouth, Disp: , Rfl:     Cyanocobalamin (VITAMIN B 12) 100 MCG LOZG, Take 1 tablet by mouth, Disp: , Rfl:    Efinaconazole (JUBLIA) 10 % SOLN, APPLY TO AFFECTED TOENAIL(S) BY TOPICAL ROUTE ONCE DAILY, Disp: , Rfl:     estradiol-norethindrone (COMBIPATCH) 0 05-0 14 MG/DAY, Place 1 patch on the skin 2 (two) times a week, Disp: 8 patch, Rfl: 3    Glucosamine-Chondroit-Calcium (TRIPLE FLEX BONE & JOINT PO), Take by mouth, Disp: , Rfl:     Multiple Vitamins-Minerals (MULTIVITAMIN ADULT EXTRA C PO), Take 1 tablet by mouth, Disp: , Rfl:     Omega-3 Fatty Acids (FISH OIL PO), Take 2 g by mouth, Disp: , Rfl:     Probiotic Product (PROBIOTIC-10 PO), Take by mouth, Disp: , Rfl:   Allergies   Allergen Reactions    Nitroglycerin Anaphylaxis       OBJECTIVE    Vitals:   Vitals:    08/21/18 1047   BP: 132/64   BP Location: Right arm   Patient Position: Sitting   Cuff Size: Standard   Pulse: 61   Resp: 13   Temp: 98 1 °F (36 7 °C)   TempSrc: Tympanic   Weight: 69 4 kg (153 lb)   Height: 5' 6" (1 676 m)     Wt Readings from Last 3 Encounters:   08/21/18 69 4 kg (153 lb)   07/02/18 72 1 kg (159 lb)   05/09/16 72 6 kg (160 lb)     Body mass index is 24 69 kg/m²  Temp Readings from Last 3 Encounters:   08/21/18 98 1 °F (36 7 °C) (Tympanic)     BP Readings from Last 3 Encounters:   08/21/18 132/64   07/02/18 122/70   05/09/16 140/70     Pulse Readings from Last 3 Encounters:   08/21/18 61     No LMP recorded  Physical Exam:    Physical Exam   Constitutional: She is oriented to person, place, and time  She appears well-developed and well-nourished  HENT:   Head: Normocephalic and atraumatic  Eyes: Conjunctivae are normal    Neck: Normal range of motion  Neck supple  Cardiovascular: Normal rate, regular rhythm, normal heart sounds and intact distal pulses  Exam reveals no gallop and no friction rub  No murmur heard  Pulmonary/Chest: Effort normal and breath sounds normal  No respiratory distress  She has no wheezes  She has no rales  Abdominal: Soft  Bowel sounds are normal  She exhibits no distension   There is no tenderness  There is no rebound  Musculoskeletal: Normal range of motion  Neurological: She is alert and oriented to person, place, and time  Skin: Skin is warm and dry  Psychiatric: She has a normal mood and affect  Vitals reviewed  Labs: I have personally reviewed all pertinent results  No visits with results within 3 Month(s) from this visit  Latest known visit with results is:   No results found for any previous visit  No results found for any previous visit  Imaging:  I have personally reviewed all pertinent results  Assessment/Plan   Patient here for screening colonoscopy      - PCP: Ana Lilia Pal MD  - Follow-up appointments:     Future Appointments  Date Time Provider Manan Lopez   11/5/2018 10:00 AM AN MAMMO 1 HEAVEN Uriarte MD, PGY-1  [facility]   8/21/2018

## 2018-09-19 ENCOUNTER — TELEPHONE (OUTPATIENT)
Dept: SURGERY | Facility: CLINIC | Age: 78
End: 2018-09-19

## 2018-09-19 NOTE — TELEPHONE ENCOUNTER
Called and spoke to patient  Post colonoscopy on 9/18/18  Doing great  Denies any N/V/F/C, eating and drinking ok  Voiding and BM with no problems  Patient is aware she will receive a call once pathology is finalized  She is also aware she will receive a letter in the mail with recommended follow up  She will conatc the office with any issues/problems  Path pending

## 2018-11-05 ENCOUNTER — HOSPITAL ENCOUNTER (OUTPATIENT)
Dept: MAMMOGRAPHY | Facility: HOSPITAL | Age: 78
Discharge: HOME | End: 2018-11-05
Attending: OBSTETRICS & GYNECOLOGY
Payer: MEDICARE

## 2018-11-05 DIAGNOSIS — Z12.39 SCREENING FOR MALIGNANT NEOPLASM OF BREAST: ICD-10-CM

## 2018-11-05 PROCEDURE — 77067 SCR MAMMO BI INCL CAD: CPT

## 2018-11-05 PROCEDURE — 77063 BREAST TOMOSYNTHESIS BI: CPT

## 2018-11-15 DIAGNOSIS — N95.1 MENOPAUSAL SYMPTOMS: ICD-10-CM

## 2018-11-21 DIAGNOSIS — N95.1 MENOPAUSAL SYMPTOMS: ICD-10-CM

## 2018-11-30 DIAGNOSIS — N95.1 MENOPAUSAL SYMPTOMS: ICD-10-CM

## 2018-12-10 ENCOUNTER — TELEPHONE (OUTPATIENT)
Dept: OBGYN CLINIC | Facility: CLINIC | Age: 78
End: 2018-12-10

## 2018-12-18 ENCOUNTER — TELEPHONE (OUTPATIENT)
Dept: OBGYN CLINIC | Facility: CLINIC | Age: 78
End: 2018-12-18

## 2018-12-18 DIAGNOSIS — N95.1 MENOPAUSAL SYMPTOMS: ICD-10-CM

## 2018-12-19 ENCOUNTER — TELEPHONE (OUTPATIENT)
Dept: OBGYN CLINIC | Facility: CLINIC | Age: 78
End: 2018-12-19

## 2018-12-19 NOTE — TELEPHONE ENCOUNTER
Pt called with having trouble getting her Rx for the Combipatch filled at the [facility] that was once [facility] and wanted to know the price as well  I called pharmacy and they said her Rx should be filled by Friday and then they will have the price for the pt once it is filled  I called patient back with the information

## 2019-10-14 ENCOUNTER — TELEPHONE (OUTPATIENT)
Dept: OBGYN CLINIC | Facility: CLINIC | Age: 79
End: 2019-10-14

## 2019-10-14 DIAGNOSIS — Z12.31 ENCOUNTER FOR SCREENING MAMMOGRAM FOR BREAST CANCER: Primary | ICD-10-CM

## 2019-12-27 ENCOUNTER — TELEPHONE (OUTPATIENT)
Dept: OBGYN CLINIC | Facility: CLINIC | Age: 79
End: 2019-12-27

## 2020-01-27 ENCOUNTER — HOSPITAL ENCOUNTER (OUTPATIENT)
Dept: MAMMOGRAPHY | Facility: HOSPITAL | Age: 80
Discharge: HOME | End: 2020-01-27
Attending: OBSTETRICS & GYNECOLOGY
Payer: MEDICARE

## 2020-01-27 VITALS — HEIGHT: 66 IN | BODY MASS INDEX: 24.59 KG/M2 | WEIGHT: 153 LBS

## 2020-01-27 DIAGNOSIS — Z12.31 ENCOUNTER FOR SCREENING MAMMOGRAM FOR BREAST CANCER: ICD-10-CM

## 2020-01-27 PROCEDURE — 77067 SCR MAMMO BI INCL CAD: CPT

## 2020-01-27 PROCEDURE — 77063 BREAST TOMOSYNTHESIS BI: CPT

## 2020-12-18 ENCOUNTER — TRANSCRIBE ORDERS (OUTPATIENT)
Dept: ADMINISTRATIVE | Facility: HOSPITAL | Age: 80
End: 2020-12-18

## 2020-12-18 ENCOUNTER — TELEPHONE (OUTPATIENT)
Dept: OBGYN CLINIC | Facility: CLINIC | Age: 80
End: 2020-12-18

## 2020-12-18 DIAGNOSIS — Z12.31 ENCOUNTER FOR SCREENING MAMMOGRAM FOR MALIGNANT NEOPLASM OF BREAST: Primary | ICD-10-CM

## 2021-05-17 ENCOUNTER — ANNUAL EXAM (OUTPATIENT)
Dept: OBGYN CLINIC | Facility: CLINIC | Age: 81
End: 2021-05-17
Payer: MEDICARE

## 2021-05-17 VITALS
HEIGHT: 66 IN | WEIGHT: 151.2 LBS | DIASTOLIC BLOOD PRESSURE: 68 MMHG | BODY MASS INDEX: 24.3 KG/M2 | SYSTOLIC BLOOD PRESSURE: 128 MMHG

## 2021-05-17 DIAGNOSIS — Z01.419 ENCOUNTER FOR GYNECOLOGICAL EXAMINATION WITHOUT ABNORMAL FINDING: Primary | ICD-10-CM

## 2021-05-17 PROCEDURE — G0101 CA SCREEN;PELVIC/BREAST EXAM: HCPCS | Performed by: OBSTETRICS & GYNECOLOGY

## 2021-05-17 RX ORDER — CHLORHEXIDINE GLUCONATE 0.12 MG/ML
RINSE ORAL
COMMUNITY

## 2021-05-17 RX ORDER — OMEGA-3/DHA/EPA/FISH OIL 300-1000MG
1 CAPSULE ORAL
COMMUNITY

## 2021-05-17 RX ORDER — LOSARTAN POTASSIUM 25 MG/1
TABLET ORAL
COMMUNITY
Start: 2021-02-03

## 2021-05-17 RX ORDER — PRIMIDONE 50 MG/1
TABLET ORAL EVERY 8 HOURS
COMMUNITY
Start: 2020-12-11

## 2021-05-17 RX ORDER — LOSARTAN POTASSIUM 50 MG/1
TABLET ORAL
COMMUNITY
Start: 2021-01-28

## 2021-05-17 NOTE — PROGRESS NOTES
Ondina Butler is a [de-identified] y o    female who presents for annual well woman exam   Patient's  is currently admitted to [address] [place] [address], he was having nausea vomiting due to a brain tumor 1st tumor seen was in the chest which is being biopsied today,  He is feeling better on steroids  Patient reports no bleeding, spotting, or discharge  Patient reports No hot flashes/night sweats, not sexually active, No vaginal dryness, sleeping poorly  Same as prior  Patient reports has not been as anxious as she usually would be many people are praying for her  Patient has noticed a little urge incontinence reviewed Kegel's and more frequent voiding  Current contraception: post menopausal status  History of abnormal Pap smear: no  Family history of uterine or ovarian cancer: no  Regular self breast exam: yes  History of abnormal mammogram: yes -  History of stable cyst  Family history of breast cancer: yes -  Sister and paternal aunt    Menstrual History:  OB History        1    Para   1    Term   1            AB        Living   1       SAB        TAB        Ectopic        Multiple        Live Births                         The following portions of the patient's history were reviewed and updated as appropriate: allergies, current medications, past family history, past medical history, past social history, past surgical history and problem list   Past Medical History:   Diagnosis Date    Arthritis     Essential tremor     Fibrocystic breast     History of colon polyps     Hx of lithotripsy     Nocturia     Osteopenia     Osteoporosis      Past Surgical History:   Procedure Laterality Date    BREAST BIOPSY Right     excisional    FOOT SURGERY      LITHOTRIPSY      renal    MD COLONOSCOPY FLX DX W/COLLJ SPEC WHEN PFRMD N/A 2018    Procedure: COLONOSCOPY with polypectomy;  Surgeon: Jam Torres MD;  Location: [facility];   Service: General     OB History        1    Para   1    Term   1            AB        Living   1       SAB        TAB        Ectopic        Multiple        Live Births                     Review of Systems  Review of Systems   Constitutional: Negative for chills, fatigue, fever and unexpected weight change  HENT: Negative for dental problem, sinus pressure and sinus pain  Eyes: Negative for visual disturbance  Respiratory: Negative for cough, shortness of breath and wheezing  Cardiovascular: Negative for chest pain and leg swelling  Gastrointestinal: Negative for constipation, diarrhea, nausea and vomiting  Genitourinary: Negative for urgency  Musculoskeletal: Negative for back pain and joint swelling  Allergic/Immunologic: Negative for environmental allergies  Neurological: Negative for dizziness and headaches  Psychiatric/Behavioral: The patient is not nervous/anxious  Objective     Vitals:    21 1550   BP: 128/68   BP Location: Left arm   Patient Position: Sitting   Cuff Size: Standard   Weight: 68 6 kg (151 lb 3 2 oz)   Height: 5' 5 5" (1 664 m)       General:   alert and oriented, in no acute distress   Heart: regular rate and rhythm, S1, S2 normal, no murmur, click, rub or gallop   Lungs: clear to auscultation bilaterally   Abdomen: soft, non-tender, without masses or organomegaly   Vulva: normal   Vagina: normal mucosa, atrophic   Cervix: no cervical motion tenderness and no lesions   Uterus: normal size, mobile, non-tender   Adnexa: normal adnexa and no mass, fullness, tenderness   Breast inspection negative, no nipple discharge or bleeding, no masses or nodularity palpable  Rectal negative, stool guaiac negative  Thyroid normal no masses or nodules  Reviewed kegels 3/5 right 2/5 left     Assessment    77-year-old  here for annual exam Pap not indicated scheduled for mammogram tomorrow,  Up-to-date on colonoscopy     currently in hospital getting biopsy for tumor in chest also has mass at base of brain     Plan    patient is scheduled for mammogram patient to please call with any concerns training done Rosmery Ferro return in 2 years for annual or sooner as needed

## 2021-05-18 ENCOUNTER — HOSPITAL ENCOUNTER (OUTPATIENT)
Dept: MAMMOGRAPHY | Facility: HOSPITAL | Age: 81
Discharge: HOME | End: 2021-05-18
Attending: OBSTETRICS & GYNECOLOGY
Payer: MEDICARE

## 2021-05-18 VITALS — BODY MASS INDEX: 24.27 KG/M2 | HEIGHT: 66 IN | WEIGHT: 151 LBS

## 2021-05-18 DIAGNOSIS — Z12.31 ENCOUNTER FOR SCREENING MAMMOGRAM FOR MALIGNANT NEOPLASM OF BREAST: ICD-10-CM

## 2021-05-18 PROCEDURE — 77063 BREAST TOMOSYNTHESIS BI: CPT

## 2021-05-18 PROCEDURE — 77067 SCR MAMMO BI INCL CAD: CPT

## 2021-12-02 ENCOUNTER — HOSPITAL ENCOUNTER (OUTPATIENT)
Dept: MRI IMAGING | Facility: HOSPITAL | Age: 81
Discharge: HOME | End: 2021-12-02
Attending: OTOLARYNGOLOGY
Payer: MEDICARE

## 2021-12-02 DIAGNOSIS — G25.0 ESSENTIAL TREMOR: ICD-10-CM

## 2021-12-02 DIAGNOSIS — H90.3 SENSORINEURAL HEARING LOSS (SNHL) OF BOTH EARS: ICD-10-CM

## 2021-12-02 DIAGNOSIS — G51.4 FACIAL TWITCHING: ICD-10-CM

## 2021-12-02 PROCEDURE — A9585 GADOBUTROL INJECTION: HCPCS | Performed by: OTOLARYNGOLOGY

## 2021-12-02 PROCEDURE — G1004 CDSM NDSC: HCPCS

## 2021-12-02 PROCEDURE — 70553 MRI BRAIN STEM W/O & W/DYE: CPT

## 2021-12-02 RX ADMIN — GADOBUTROL 6 ML: 604.72 INJECTION INTRAVENOUS at 22:11

## 2022-05-02 ENCOUNTER — TELEPHONE (OUTPATIENT)
Dept: OBGYN CLINIC | Facility: CLINIC | Age: 82
End: 2022-05-02

## 2022-05-02 DIAGNOSIS — Z12.31 ENCOUNTER FOR SCREENING MAMMOGRAM FOR BREAST CANCER: Primary | ICD-10-CM

## 2022-05-02 NOTE — TELEPHONE ENCOUNTER
Pt called and said she was due for her yearly and mammagram this year  She was wondering if it was necessary due to her age  I told her I wasn't sure and that someone will get back to her on that  We can leave a detailed message on her voicemail if she doesn't answer

## 2022-05-02 NOTE — TELEPHONE ENCOUNTER
Pt aware not due for annual since has medicare and was here last year, medicare pays every other year  Can get mammogram though based on fam hx  Order placed and scheduling number provided to patient

## 2022-08-18 ENCOUNTER — HOSPITAL ENCOUNTER (OUTPATIENT)
Dept: MAMMOGRAPHY | Facility: HOSPITAL | Age: 82
Discharge: HOME | End: 2022-08-18
Attending: OBSTETRICS & GYNECOLOGY
Payer: MEDICARE

## 2022-08-18 VITALS — HEIGHT: 66 IN | WEIGHT: 151.9 LBS | BODY MASS INDEX: 24.41 KG/M2

## 2022-08-18 DIAGNOSIS — Z12.31 ENCOUNTER FOR SCREENING MAMMOGRAM FOR BREAST CANCER: ICD-10-CM

## 2022-08-18 PROCEDURE — 77063 BREAST TOMOSYNTHESIS BI: CPT

## 2022-08-18 PROCEDURE — 77067 SCR MAMMO BI INCL CAD: CPT

## 2023-01-20 ENCOUNTER — TELEPHONE (OUTPATIENT)
Dept: NEUROLOGY | Facility: CLINIC | Age: 83
End: 2023-01-20

## 2023-05-02 ENCOUNTER — TELEPHONE (OUTPATIENT)
Dept: OBGYN CLINIC | Facility: CLINIC | Age: 83
End: 2023-05-02

## 2023-05-02 NOTE — TELEPHONE ENCOUNTER
Left message for pt that when she had her last mammogram it was recommended to repeat routine screening mammo in 1 year for both breasts  Advised pt can wait to see Dr Matthias Rincon for script or we can place order so she can schedule

## 2023-05-02 NOTE — TELEPHONE ENCOUNTER
Pt called and scheduled her yearly appt with Dr Samson Downs in September but is asking if she needs to schedule a mammogram now that she's over 80  She would like a call back and it is okay to leave a message

## 2023-09-14 ENCOUNTER — OFFICE VISIT (OUTPATIENT)
Dept: NEUROLOGY | Facility: CLINIC | Age: 83
End: 2023-09-14
Payer: COMMERCIAL

## 2023-09-14 VITALS
DIASTOLIC BLOOD PRESSURE: 58 MMHG | HEART RATE: 63 BPM | WEIGHT: 151 LBS | BODY MASS INDEX: 25.13 KG/M2 | SYSTOLIC BLOOD PRESSURE: 126 MMHG

## 2023-09-14 DIAGNOSIS — G25.0 ESSENTIAL TREMOR: Primary | ICD-10-CM

## 2023-09-14 PROCEDURE — 99204 OFFICE O/P NEW MOD 45 MIN: CPT | Performed by: PSYCHIATRY & NEUROLOGY

## 2023-09-14 NOTE — ASSESSMENT & PLAN NOTE
Slowly progressive action tremors of the hands and head without any parkinsonian symptoms, consistent with her diagnosis of essential tremor. There is a strong family history of Parkinson's disease with she understands may slightly increase her risk of developing PD in the future but at this point she has not developed any symptoms other than a slight right hand intermittent rest tremor. Previous tried on low-dose propanolol years ago which was felt to be ineffective and discontinued due to hair loss. Patient has no recollection of this trial.  She is currently on primidone with partial response. More recent worsening of tremor. We discussed potential option of slowly increasing her primidone if tolerated. Recommended she try primidone 50mg 1.5 tabs nightly    We discussed potential benefits of considering OT for tremor reducing strategies and adaptive equipment. He feels this is not needed at this time. We briefly discussed neurosurgical options used in essential tremor such as high-frequency ultrasound and deep ventilation surgery. At this point her tremor is not bothersome enough to consider this.

## 2023-09-14 NOTE — PROGRESS NOTES
Patient ID: Ranjana Lee is a 80 y.o. female. Assessment/Plan:    Essential tremor  Slowly progressive action tremors of the hands and head without any parkinsonian symptoms, consistent with her diagnosis of essential tremor. There is a strong family history of Parkinson's disease with she understands may slightly increase her risk of developing PD in the future but at this point she has not developed any symptoms other than a slight right hand intermittent rest tremor. Previous tried on low-dose propanolol years ago which was felt to be ineffective and discontinued due to hair loss. Patient has no recollection of this trial.  She is currently on primidone with partial response. More recent worsening of tremor. We discussed potential option of slowly increasing her primidone if tolerated. Recommended she try primidone 50mg 1.5 tabs nightly    We discussed potential benefits of considering OT for tremor reducing strategies and adaptive equipment. He feels this is not needed at this time. We briefly discussed neurosurgical options used in essential tremor such as high-frequency ultrasound and deep ventilation surgery. At this point her tremor is not bothersome enough to consider this. Diagnoses and all orders for this visit:    Essential tremor           Subjective:    Kari Williamson is an 80year old woman with essential tremor and hemifacial spasm who presents for movement disorder evaluation for anoyhter opinion/        Action tremor began gradually about 4 years ago in bilateral hands. More recently noted an intermittent head tremor. She was diagnosed with ET at [facility] neurology and started on medication. Tremor slowly worsened to include head tremor. No vocal, and leg tremor. It is present with action and can interfere with eating soup and drinking. Using two hands helps. Handwriting is unchanged. Tremor does improve with alcohol consumption. Medications for tremor have not been tried.  There is no associated weakness, numbness, or posturing. She denies any issues with stiffness. She has intermittent cramps she attributes to neuropathy.                                                                                                                                            FH: She has 6 siblings. Three passed with PD. One sister had tremor. Father may have had a tremor. Two maternal cousins with PD. No changes in olfaction. She has difficulty with sleep maintenance as she awaken to use the restroom. No RBD symptoms. She is on primidone 50mg 1 tabs nightly. She has never been on higher doses. She staggers when she walks but is not sure if this is related to primidone. No improvement noted since on medication. She was trialed on propranolol 10mg tid but chart states she stopped due to hair loss and nno effect. Objective:    Blood pressure 126/58, pulse 63, weight 68.5 kg (151 lb). Physical Exam  Vitals reviewed. Eyes:      Extraocular Movements: Extraocular movements intact. Neurological:      Motor: Motor strength is normal.     Deep Tendon Reflexes:      Reflex Scores:       Tricep reflexes are 2+ on the right side and 2+ on the left side. Bicep reflexes are 2+ on the right side and 2+ on the left side. Brachioradialis reflexes are 2+ on the right side and 2+ on the left side. Patellar reflexes are 2+ on the right side and 2+ on the left side. Achilles reflexes are 1+ on the right side and 1+ on the left side. Psychiatric:         Speech: Speech normal.         Neurological Exam  Mental Status   Oriented to person, place, time and situation. Recent and remote memory are intact. Speech is normal. Follows complex commands. Attention and concentration are normal.    Cranial Nerves  CN III, IV, VI: Extraocular movements intact bilaterally. CN V: Facial sensation is normal.  CN VII: Full and symmetric facial movement. CN VIII:  Right: Hearing is decreased. Wears aides. Left: Hearing is decreased. CN IX, X: Palate elevates symmetrically  CN XI: Shoulder shrug strength is normal.  CN XII: Tongue midline without atrophy or fasciculations. Motor  Normal muscle bulk throughout. Normal muscle tone. Cogwheel on right. Strength is 5/5 throughout all four extremities. Sensory  Light touch is normal in upper and lower extremities. Reflexes                                            Right                      Left  Brachioradialis                    2+                         2+  Biceps                                 2+                         2+  Triceps                                2+                         2+  Patellar                                2+                         2+  Achilles                                1+                         1+  Glabellar tap absent. Right palmomental present. Slight. Left palmomental present. Slight. Coordination    RUE: mild, intermittent rest tremor (when distracted)    No vocal tremor  Horizontal head tremor- mild and intermittent   Mild postural tremor bilaterally 1,1  Intentional tremor RUE 1, LUE 2 moderate amplitude   Mild tremor on spirals 1,1 , right slightly smaller  Handwriting normal in size    . Gait  Casual gait is normal including stance, stride, and arm swing. Able to rise from chair without using arms. ROS:    Review of Systems   Constitutional: Negative for appetite change, fatigue and fever. HENT: Negative. Negative for hearing loss, tinnitus, trouble swallowing and voice change. Eyes: Negative. Negative for photophobia, pain and visual disturbance. Respiratory: Negative. Negative for shortness of breath. Cardiovascular: Negative. Negative for palpitations. Gastrointestinal: Negative. Negative for nausea and vomiting. Endocrine: Negative. Negative for cold intolerance. Genitourinary: Negative. Negative for dysuria, frequency and urgency.    Musculoskeletal: Positive for myalgias (Legs). Negative for back pain, gait problem and neck pain. Balance Issues   Skin: Negative. Negative for rash. Allergic/Immunologic: Negative. Neurological: Positive for tremors (Hands) and numbness (Legs, Neuropathy ). Negative for dizziness, seizures, syncope, facial asymmetry, speech difficulty, weakness, light-headedness and headaches. Hematological: Negative. Does not bruise/bleed easily. Psychiatric/Behavioral: Positive for confusion (At times) and sleep disturbance. Negative for hallucinations. States at times forgets names    All other systems reviewed and are negative.

## 2023-09-14 NOTE — PATIENT INSTRUCTIONS
Slowly progressive action and intentional tremors of the hands, vocal and head tremor without any parkinsonian symptoms, consistent with her diagnosis of essential tremor. We discussed the slowly progressive nature of this condition, its pathology, and medication treatment options. Surgical options including deep brain stimulation surgery and MRI focused ultrasound therapy were discussed. Can try increasing primidone 50mg 1.5 tabs nightly    Consider OT for tremor reducing strategies and adaptive equipment.

## 2023-09-22 ENCOUNTER — ANNUAL EXAM (OUTPATIENT)
Dept: OBGYN CLINIC | Facility: CLINIC | Age: 83
End: 2023-09-22

## 2023-09-22 VITALS
WEIGHT: 151.6 LBS | DIASTOLIC BLOOD PRESSURE: 80 MMHG | HEIGHT: 65 IN | SYSTOLIC BLOOD PRESSURE: 126 MMHG | BODY MASS INDEX: 25.26 KG/M2

## 2023-09-22 DIAGNOSIS — Z12.31 ENCOUNTER FOR SCREENING MAMMOGRAM FOR BREAST CANCER: ICD-10-CM

## 2023-09-22 DIAGNOSIS — Z01.419 ENCOUNTER FOR GYNECOLOGICAL EXAMINATION WITHOUT ABNORMAL FINDING: Primary | ICD-10-CM

## 2023-09-22 NOTE — PROGRESS NOTES
Mckenna Walton is a 80 y.o. G1, P1  female who presents for annual well woman exam. Patient reports no bleeding, spotting, or discharge. Patient reports Yes decreasing hot flashes/night sweats, , No vaginal dryness, sleeping better has been  2 years. Current contraception: post menopausal status  History of abnormal Pap smear: no  Family history of uterine or ovarian cancer: no  Regular self breast exam: yes  History of abnormal mammogram: yes -history of stable cyst  Family history of breast cancer: yes -sister and paternal aunt    Menstrual History:  OB History        1    Para   1    Term   1            AB        Living   1       SAB        IAB        Ectopic        Multiple        Live Births                   The following portions of the patient's history were reviewed and updated as appropriate: allergies, current medications, past family history, past medical history, past social history, past surgical history and problem list.  Past Medical History:   Diagnosis Date   • Arthritis    • Essential tremor    • History of colon polyps    • Hx of lithotripsy    • Hypertension    • Nocturia    • Osteopenia    • Osteoporosis      Past Surgical History:   Procedure Laterality Date   • BREAST BIOPSY Right 1983    excisional, negative   • FOOT SURGERY     • LITHOTRIPSY      renal   • DC COLONOSCOPY FLX DX W/COLLJ SPEC WHEN PFRMD N/A 2018    Procedure: COLONOSCOPY with polypectomy;  Surgeon: Nancey Prader, MD;  Location: [facility]; Service: General     OB History        1    Para   1    Term   1            AB        Living   1       SAB        IAB        Ectopic        Multiple        Live Births                     Review of Systems  Review of Systems   Constitutional: Negative. Negative for chills and fever. HENT: Negative. Negative for ear pain and sore throat. Eyes: Negative. Negative for pain and visual disturbance.    Respiratory: Negative. Negative for cough and shortness of breath. Cardiovascular: Negative. Negative for chest pain and palpitations. Gastrointestinal: Negative. Negative for abdominal pain and vomiting. Genitourinary: Negative. Negative for dysuria and hematuria. Musculoskeletal: Negative. Negative for arthralgias and back pain. Skin: Negative. Negative for color change and rash. Neurological: Negative. Negative for seizures and syncope. All other systems reviewed and are negative. Objective   Vitals:    09/22/23 1503   BP: 126/80   BP Location: Left arm   Patient Position: Sitting   Cuff Size: Standard   Weight: 68.8 kg (151 lb 9.6 oz)   Height: 5' 5" (1.651 m)     General:   alert and oriented, in no acute distress   Heart: regular rate and rhythm, S1, S2 normal, no murmur, click, rub or gallop   Lungs: clear to auscultation bilaterally   Abdomen: soft, non-tender, without masses or organomegaly   Vulva: normal   Vagina: normal mucosa   Cervix: no cervical motion tenderness and no lesions   Uterus: normal size, mobile, non-tender   Adnexa: normal adnexa and no mass, fullness, tenderness   Breast inspection negative, no nipple discharge or bleeding, no masses or nodularity palpable  Rectal negative, stool guaiac negative  Thyroid normal no masses or nodules     Assessment   80year-old G1, P1 here for annual exam, patient has been  2 years.   Reviewed with patient option to decrease screening and to continue to follow with primary care physician     Plan   Return in 2 years or longer if desired No

## 2023-10-02 NOTE — PROGRESS NOTES
Edita Chicas is a 80 y. o.female who is here for a:   Chief Complaint   Patient presents with   • Screening Colonoscopy     No family history of colon cancer. No issues for patient. Patient has a few questions for the provider before she decides if she wants the colonoscopy      Last colonoscopy was 2018 with a small polyp in the transverse colon. There is a small to moderate size polyp in the right colon that was not able to be retrieved      Assessment/Plan:   Edita Chicas is a 80 y. o.female who is here for a:     Question regarding follow-up colonoscopy    At almost 80, in relatively good health I reviewed her colonoscopy history. In truth she only had 1 small polyp back in 2010. In 2013 and 2018 no true polyps were seen. She  has no symptoms at this time. We discussed the pros and cons of a colonoscopy and she is very reluctant to undergo this test.  Therefore we will permit her to have a Cologuard on an every year or other year basis through her primary care physician. We ordered the first 1 for her today and she will receive the kit  We will follow-up on these results and refer her back to her primary care. Because of her sisters tree of breast cancer we did convince her to go for mammogram!      Plan:   Cologuard  She knows to return to this office if she develops any anemia or change in bowel habits, melena or bright red blood. Previous Colonoscopy and  Location of polyps if any:   5 years ago and  with polyps in the :   Ascending colon  and transverse colon. .      Preoperative Clearance: None      In preparation for this visit all relevant and known prior office notes, prior consultations, emergency room visits, blood work results, and imaging studies were personally reviewed. A total of  30 minutes was spent reviewing all of this information. , caring for this patient, providing differential diagnosis, instructions for management, counseling and coordination of care.   This also includes planning surgical intervention where indicated. Preoperative Clearance: None      Patient was given full preop instructions including:  No fruits or vegetables or high roughage foods for 1 week prior to the procedure. Clear liquids the day before the procedure, nothing by mouth after midnight the day before for the procedure. Bowel prep instructions were given in writing to the patient and verbally reviewed. Notify the office if patient is on any blood thinners. Discontinue any weight loss medications 1 week prior to procedure unless instructed specifically otherwise. Consult primary care physician for management of any diabetic medications. Continue beta-blockers but no other hypertensive medications prior to the procedure. The medication list was reviewed in its entirety with the patient for preop instructions. All preop medication should be taken per the anesthesia guidelines and as instructed by PAT in the pre-op visit or as otherwise instructed by the patient's PCP. The patient is instructed to check with anesthesia preop and/or PCP regarding medications, especially any anticoagulation, hypertension medications, or diabetes medications. Patient is instructed to stop any herbal medications or over-the-counter medications one week prior to the procedure.          Current Outpatient Medications:   •  B Complex Vitamins (B COMPLEX 1 PO), Take 1 tablet by mouth, Disp: , Rfl:   •  chlorhexidine (PERIDEX) 0.12 % solution, , Disp: , Rfl:   •  cholecalciferol (VITAMIN D3) 1,000 units tablet, Take 1,000 Units by mouth, Disp: , Rfl:   •  Coenzyme Q10 10 MG capsule, Take 10 mg by mouth, Disp: , Rfl:   •  Cyanocobalamin (VITAMIN B 12) 100 MCG LOZG, Take 1 tablet by mouth, Disp: , Rfl:   •  fish oil-omega-3 fatty acids 1000 MG capsule, Take 1 g by mouth, Disp: , Rfl:   •  losartan (COZAAR) 25 mg tablet, 25 mg daily in addition to 50 mg daily for a total of 75 mg daily, Disp: , Rfl:   • losartan (COZAAR) 50 mg tablet, TAKE ONE TABLET BY MOUTH EVERY DAY IN ADDITION TO 25MG TABLET FOR A TOTAL DOSE OF 75MG, Disp: , Rfl:   •  primidone (MYSOLINE) 50 mg tablet, Take by mouth every 8 (eight) hours, Disp: , Rfl:   •  Probiotic Product (PROBIOTIC-10 PO), Take by mouth, Disp: , Rfl:   •  ALPRAZolam (XANAX) 0.25 mg tablet, Take 8 tablets (2 mg total) by mouth daily at bedtime as needed for anxiety (2 hours prio to MRI, repeat if needed prior to exam. Can't drive), Disp: 2 tablet, Rfl: 0  •  estradiol-norethindrone (COMBIPATCH) 0.05-0.14 MG/DAY, Place 1 patch on the skin 2 (two) times a week, Disp: 24 patch, Rfl: 3  •  fluticasone (FLONASE) 50 mcg/act nasal spray, 1 spray into each nostril daily, Disp: 16 g, Rfl: 1  •  Glucosamine-Chondroit-Calcium (TRIPLE FLEX BONE & JOINT PO), Take by mouth (Patient not taking: Reported on 10/9/2023), Disp: , Rfl:   •  Multiple Vitamins-Minerals (MULTIVITAMIN ADULT EXTRA C PO), Take 1 tablet by mouth, Disp: , Rfl:   •  Omega-3 Fatty Acids (FISH OIL PO), Take 2 g by mouth, Disp: , Rfl:        ______________________________________________________    HPI:  Edita Chicas is a 80 y. o.female who was referred for evaluation of    Diagnostic . Currently:  Symptoms include:  no abdominal pain, change in bowel habits, or black or bloody stools.       Family history of colon cancer: None reported       Anticoagulation: none    A1C:     LABS:      No results found for: "WBC", "HGB", "HCT", "MCV", "PLT"  No results found for: "NA", "K", "CL", "CO2", "ANIONGAP", "BUN", "CREATININE", "GLUCOSE", "GLUF", "CALCIUM", "Sharp Gins", "AST", "ALT", "ALKPHOS", "PROT", "BILITOT", "EGFR"  Lab Results   Component Value Date    HGBA1C 5.9 (H) 01/29/2019     No results found for: "INR", "PROTIME"      No orders to display           ROS:  General ROS: negative for - chills, fatigue, fever or night sweats, weight loss  Respiratory ROS: no cough, shortness of breath, or wheezing  Cardiovascular ROS: no chest pain or dyspnea on exertion  Genito-Urinary ROS: no dysuria, trouble voiding, or hematuria  Musculoskeletal ROS: negative for - gait disturbance, joint pain or muscle pain  Neurological ROS: no TIA or stroke symptoms  GI ROS: see HPI  Skin ROS: no new rashes or lesions   Lymphatic ROS: no new adenopathy noted by pt. GYN ROS: see HPI, no new GYN history or bleeding noted  Psy ROS: no new mental or behavioral disturbances           Imaging: No new pertinent imaging studies.          Patient Active Problem List   Diagnosis   • Acquired polyneuropathy   • Tarsal tunnel syndrome of both lower extremities   • History of colon polyps   • Essential tremor   • Diabetic neuropathy (HCC)         Allergies:  Nitroglycerin, Carboxymethylcellulose sodium, Nickel, Soy allergy - food allergy, and Other      Current Outpatient Medications:   •  B Complex Vitamins (B COMPLEX 1 PO), Take 1 tablet by mouth, Disp: , Rfl:   •  chlorhexidine (PERIDEX) 0.12 % solution, , Disp: , Rfl:   •  cholecalciferol (VITAMIN D3) 1,000 units tablet, Take 1,000 Units by mouth, Disp: , Rfl:   •  Coenzyme Q10 10 MG capsule, Take 10 mg by mouth, Disp: , Rfl:   •  Cyanocobalamin (VITAMIN B 12) 100 MCG LOZG, Take 1 tablet by mouth, Disp: , Rfl:   •  fish oil-omega-3 fatty acids 1000 MG capsule, Take 1 g by mouth, Disp: , Rfl:   •  losartan (COZAAR) 25 mg tablet, 25 mg daily in addition to 50 mg daily for a total of 75 mg daily, Disp: , Rfl:   •  losartan (COZAAR) 50 mg tablet, TAKE ONE TABLET BY MOUTH EVERY DAY IN ADDITION TO 25MG TABLET FOR A TOTAL DOSE OF 75MG, Disp: , Rfl:   •  primidone (MYSOLINE) 50 mg tablet, Take by mouth every 8 (eight) hours, Disp: , Rfl:   •  Probiotic Product (PROBIOTIC-10 PO), Take by mouth, Disp: , Rfl:   •  ALPRAZolam (XANAX) 0.25 mg tablet, Take 8 tablets (2 mg total) by mouth daily at bedtime as needed for anxiety (2 hours prio to MRI, repeat if needed prior to exam. Can't drive), Disp: 2 tablet, Rfl: 0  • estradiol-norethindrone (COMBIPATCH) 0.05-0.14 MG/DAY, Place 1 patch on the skin 2 (two) times a week, Disp: 24 patch, Rfl: 3  •  fluticasone (FLONASE) 50 mcg/act nasal spray, 1 spray into each nostril daily, Disp: 16 g, Rfl: 1  •  Glucosamine-Chondroit-Calcium (TRIPLE FLEX BONE & JOINT PO), Take by mouth (Patient not taking: Reported on 10/9/2023), Disp: , Rfl:   •  Multiple Vitamins-Minerals (MULTIVITAMIN ADULT EXTRA C PO), Take 1 tablet by mouth, Disp: , Rfl:   •  Omega-3 Fatty Acids (FISH OIL PO), Take 2 g by mouth, Disp: , Rfl:     Past Medical History:   Diagnosis Date   • Arthritis    • Essential tremor    • History of colon polyps    • Hx of lithotripsy    • Hypertension    • Nocturia    • Osteopenia    • Osteoporosis        Past Surgical History:   Procedure Laterality Date   • BREAST BIOPSY Right 1983    excisional, negative   • FOOT SURGERY     • LITHOTRIPSY      renal   • MN COLONOSCOPY FLX DX W/COLLJ SPEC WHEN PFRMD N/A 9/18/2018    Procedure: COLONOSCOPY with polypectomy;  Surgeon: Enma Floyd MD;  Location: [facility]; Service: General       Family History   Problem Relation Age of Onset   • Cerebral aneurysm Mother    • Heart disease Father    • Hypertension Father    • Breast cancer Sister 40   • Parkinsonism Sister    • Parkinsonism Sister    • Parkinsonism Sister    • No Known Problems Daughter    • No Known Problems Maternal Grandmother    • No Known Problems Maternal Grandfather    • No Known Problems Paternal Grandmother    • No Known Problems Paternal Grandfather    • Cancer Brother 72        gastric,prostate   • Prostate cancer Brother 59   • Parkinsonism Brother    • Parkinsonism Brother    • Breast cancer Paternal Aunt         unknown age   • No Known Problems Paternal Aunt    • No Known Problems Paternal Aunt    • No Known Problems Paternal Aunt        Social History     Socioeconomic History   • Marital status:       Spouse name: None   • Number of children: None   • Years of education: None   • Highest education level: None   Occupational History   • None   Tobacco Use   • Smoking status: Never   • Smokeless tobacco: Never   Vaping Use   • Vaping Use: Never used   Substance and Sexual Activity   • Alcohol use: No   • Drug use: No   • Sexual activity: Not Currently     Comment: pt declines hiv std testing   Other Topics Concern   • None   Social History Narrative   • None     Social Determinants of Health     Financial Resource Strain: Not on file   Food Insecurity: Not on file   Transportation Needs: Not on file   Physical Activity: Not on file   Stress: Not on file   Social Connections: Not on file   Intimate Partner Violence: Not on file   Housing Stability: Not on file       Exam:   Vitals:    10/09/23 1306   BP: 108/72   Pulse: 66   Temp: 97.7 °F (36.5 °C)           ______________________________________________________    PHYSICAL EXAM  General Appearance:    Alert, cooperative, no distress,      Head:    Normocephalic without obvious abnormality   Eyes:    PERRL, conjunctiva/corneas clear,        Neck:   Supple, no adenopathy, no JVD   Back:     Symmetric, no spinal or CVA tenderness   Lungs:     Clear to auscultation bilaterally,    Heart:    Regular rate and rhythm, S1 and S2 normal, no murmur   Abdomen:     Non-tender in RUQ, LUQ, RLQ, LLQ, no browning's signs. No visible masses or pulsations. Extremities:   Extremities normal. No clubbing, cyanosis or edema   Psych:   Normal Affect   Neurologic:   CNII-XII intact. Strength symmetric, speech intact                 Cecilia Hanley MD  Date: 10/9/2023 Time: 1:37 PM       This report has been generated by a voice recognition software system. Therefore, there may be syntax, spelling, and/or grammatical errors. Please call if you've any questions. This  encounter has been billed by a non certified Coder. Informed consent for procedure was personally discussed, reviewed, and signed by Dr. Flaco Vargas.  Discussion by  Flaco Vargas was carried out regarding risks, benefits, and alternatives with the patient. Risks include but are not limited to:  bleeding, infection, and delayed wound healing or an open wound, pulmonary embolus, leaks from bowel or bile ducts or other viscus, transfusions, death. Discussed in further detail the more common complications and their rates of occurrence.  was used if necessary. Patient expressed understanding of the issues discussed and wished/consented to proceed. All questions were answered by Dr. Flaco Vargas. Discussion performed between patient and the provider signing below. Signature:   Cecilia Hanley MD  Date: 10/9/2023 Time: 1:37 PM                                                                                                                                        Informed consent for procedure was personally discussed, reviewed, and signed by Dr. Flaco Vargas. Discussion by Dr. Flaco Vargas was carried out regarding risks, benefits, and alternatives with the patient. Risks include but are not limited to:  bleeding, infection, and delayed wound healing or an open wound, pulmonary embolus, leaks from bowel or bile ducts or other viscus, transfusions, death. Discussed in further detail the more common complications and their rates of occurrence.  was used if necessary. Patient expressed understanding of the issues discussed and wished/consented to proceed. All questions were answered by Dr. Flaco Vargas. Discussion performed between patient and the provider signing below. Signature:   Cecilia Hanley MD    Date: 10/9/2023 Time: 1:37 PM           Some portions of this record may have been generated with voice recognition software. There may be translation, syntax,  or grammatical errors. Occasional wrong word or "sound-a-like" substitutions may have occurred due to the inherent limitations of the voice recognition software.  Read the chart carefully and recognize, using context, where substitutions may have occurred. If you have any questions, please contact the dictating provider for clarification or correction, as needed. This encounter has been coded by a non-certified coder.

## 2023-10-09 ENCOUNTER — OFFICE VISIT (OUTPATIENT)
Dept: SURGERY | Facility: CLINIC | Age: 83
End: 2023-10-09
Payer: COMMERCIAL

## 2023-10-09 VITALS
DIASTOLIC BLOOD PRESSURE: 72 MMHG | TEMPERATURE: 97.7 F | BODY MASS INDEX: 24.99 KG/M2 | WEIGHT: 150 LBS | HEART RATE: 66 BPM | HEIGHT: 65 IN | SYSTOLIC BLOOD PRESSURE: 108 MMHG

## 2023-10-09 DIAGNOSIS — E11.40 DIABETIC NEUROPATHY (HCC): ICD-10-CM

## 2023-10-09 DIAGNOSIS — Z86.010 HISTORY OF COLON POLYPS: Primary | ICD-10-CM

## 2023-10-09 PROCEDURE — 99214 OFFICE O/P EST MOD 30 MIN: CPT | Performed by: SURGERY

## 2023-10-16 ENCOUNTER — TELEPHONE (OUTPATIENT)
Dept: OBGYN CLINIC | Facility: CLINIC | Age: 83
End: 2023-10-16

## 2023-10-16 NOTE — TELEPHONE ENCOUNTER
Reviewed chart - Dr. Josh Joyner ordered a Mammogram on 09/23/23. Order is on file. Patient notified.

## 2023-11-11 LAB — COLOGUARD RESULT REPORTABLE: NEGATIVE

## 2023-11-14 ENCOUNTER — TELEPHONE (OUTPATIENT)
Dept: SURGERY | Facility: CLINIC | Age: 83
End: 2023-11-14

## 2023-11-14 NOTE — TELEPHONE ENCOUNTER
----- Message from Torey Torres PA-C sent at 11/13/2023  8:06 AM EST -----  Please let her know her cologuard was negative

## 2023-11-15 NOTE — TELEPHONE ENCOUNTER
Note: Pt returned MA's call re her Cologuard results. Read Micheal Carrillo message indicating results were negative. If there is anything else that needed to be discussed with the pt re this, please contact her directly.

## 2023-11-16 NOTE — TELEPHONE ENCOUNTER
Patient notified of the negative cologuard. Patient would like to know if she should continue to do the cologuard (if so, when) or not because of her age?

## 2023-11-24 ENCOUNTER — TELEPHONE (OUTPATIENT)
Dept: NEUROLOGY | Facility: CLINIC | Age: 83
End: 2023-11-24

## 2023-11-24 DIAGNOSIS — G25.0 ESSENTIAL TREMOR: Primary | ICD-10-CM

## 2023-11-24 NOTE — TELEPHONE ENCOUNTER
Patient is asking if Dr Jeremie Menjivar can prescribe Primidone. The doctor who prescribes is her old neurologist. She needs an appointment with him first and she doesn't feel she needs to see him. She saw doctor Dr Jeremie Menjivar back in September. Also if Dr Jeremie Menjivar prefers to prescribe something else for her tremors she will take it. Patient only has 2 left. Please reach out to her on cell phone. She uses [facility] in [facility].

## 2023-11-24 NOTE — TELEPHONE ENCOUNTER
Per Dr. Kandis Richey note of 9/14/23:    "Recommended she try primidone 50mg 1.5 tabs nightly"      Pended the med in this encounter and routed to provider to review and refill, if appropriate.

## 2023-11-27 RX ORDER — PRIMIDONE 50 MG/1
75 TABLET ORAL
Qty: 45 TABLET | Refills: 3 | Status: SHIPPED | OUTPATIENT
Start: 2023-11-27

## 2024-02-20 ENCOUNTER — HOSPITAL ENCOUNTER (OUTPATIENT)
Dept: MAMMOGRAPHY | Facility: HOSPITAL | Age: 84
Discharge: HOME | End: 2024-02-20
Attending: OBSTETRICS & GYNECOLOGY
Payer: COMMERCIAL

## 2024-02-20 VITALS — HEIGHT: 65 IN | WEIGHT: 149.91 LBS | BODY MASS INDEX: 24.98 KG/M2

## 2024-02-20 DIAGNOSIS — Z12.31 ENCOUNTER FOR SCREENING MAMMOGRAM FOR BREAST CANCER: ICD-10-CM

## 2024-02-20 PROCEDURE — 77067 SCR MAMMO BI INCL CAD: CPT

## 2024-02-20 PROCEDURE — 77063 BREAST TOMOSYNTHESIS BI: CPT

## 2024-08-22 ENCOUNTER — HOSPITAL ENCOUNTER (INPATIENT)
Facility: HOSPITAL | Age: 84
LOS: 1 days | Discharge: HOME | DRG: 305 | End: 2024-08-23
Attending: EMERGENCY MEDICINE | Admitting: INTERNAL MEDICINE
Payer: COMMERCIAL

## 2024-08-22 ENCOUNTER — APPOINTMENT (EMERGENCY)
Dept: RADIOLOGY | Facility: HOSPITAL | Age: 84
DRG: 305 | End: 2024-08-22
Payer: COMMERCIAL

## 2024-08-22 DIAGNOSIS — I44.7 INCOMPLETE LEFT BUNDLE BRANCH BLOCK (LBBB): ICD-10-CM

## 2024-08-22 DIAGNOSIS — I10 HTN (HYPERTENSION): ICD-10-CM

## 2024-08-22 DIAGNOSIS — R01.1 HEART MURMUR: Primary | ICD-10-CM

## 2024-08-22 DIAGNOSIS — R07.9 CHEST PAIN: ICD-10-CM

## 2024-08-22 PROBLEM — R09.89 PULMONARY VENOUS CONGESTION: Status: ACTIVE | Noted: 2024-08-22

## 2024-08-22 LAB
2HR DELTA HS TROPONIN: 2 NG/L
4HR DELTA HS TROPONIN: 1 NG/L
ALBUMIN SERPL BCG-MCNC: 4.1 G/DL (ref 3.5–5)
ALP SERPL-CCNC: 76 U/L (ref 34–104)
ALT SERPL W P-5'-P-CCNC: 14 U/L (ref 7–52)
ANION GAP SERPL CALCULATED.3IONS-SCNC: 6 MMOL/L (ref 4–13)
AST SERPL W P-5'-P-CCNC: 22 U/L (ref 13–39)
BASOPHILS # BLD AUTO: 0.04 THOUSANDS/ÂΜL (ref 0–0.1)
BASOPHILS NFR BLD AUTO: 0 % (ref 0–1)
BILIRUB SERPL-MCNC: 0.38 MG/DL (ref 0.2–1)
BUN SERPL-MCNC: 21 MG/DL (ref 5–25)
CALCIUM SERPL-MCNC: 9.1 MG/DL (ref 8.4–10.2)
CARDIAC TROPONIN I PNL SERPL HS: 10 NG/L
CARDIAC TROPONIN I PNL SERPL HS: 11 NG/L
CARDIAC TROPONIN I PNL SERPL HS: 12 NG/L
CHLORIDE SERPL-SCNC: 105 MMOL/L (ref 96–108)
CO2 SERPL-SCNC: 27 MMOL/L (ref 21–32)
CREAT SERPL-MCNC: 0.97 MG/DL (ref 0.6–1.3)
EOSINOPHIL # BLD AUTO: 0.11 THOUSAND/ÂΜL (ref 0–0.61)
EOSINOPHIL NFR BLD AUTO: 1 % (ref 0–6)
ERYTHROCYTE [DISTWIDTH] IN BLOOD BY AUTOMATED COUNT: 13 % (ref 11.6–15.1)
GFR SERPL CREATININE-BSD FRML MDRD: 54 ML/MIN/1.73SQ M
GLUCOSE SERPL-MCNC: 95 MG/DL (ref 65–140)
HCT VFR BLD AUTO: 41.5 % (ref 34.8–46.1)
HGB BLD-MCNC: 13.8 G/DL (ref 11.5–15.4)
IMM GRANULOCYTES # BLD AUTO: 0.02 THOUSAND/UL (ref 0–0.2)
IMM GRANULOCYTES NFR BLD AUTO: 0 % (ref 0–2)
LYMPHOCYTES # BLD AUTO: 2.99 THOUSANDS/ÂΜL (ref 0.6–4.47)
LYMPHOCYTES NFR BLD AUTO: 32 % (ref 14–44)
MCH RBC QN AUTO: 32.2 PG (ref 26.8–34.3)
MCHC RBC AUTO-ENTMCNC: 33.3 G/DL (ref 31.4–37.4)
MCV RBC AUTO: 97 FL (ref 82–98)
MONOCYTES # BLD AUTO: 0.75 THOUSAND/ÂΜL (ref 0.17–1.22)
MONOCYTES NFR BLD AUTO: 8 % (ref 4–12)
NEUTROPHILS # BLD AUTO: 5.4 THOUSANDS/ÂΜL (ref 1.85–7.62)
NEUTS SEG NFR BLD AUTO: 59 % (ref 43–75)
NRBC BLD AUTO-RTO: 0 /100 WBCS
PLATELET # BLD AUTO: 249 THOUSANDS/UL (ref 149–390)
PLATELET # BLD AUTO: 271 THOUSANDS/UL (ref 149–390)
PMV BLD AUTO: 10 FL (ref 8.9–12.7)
PMV BLD AUTO: 9.8 FL (ref 8.9–12.7)
POTASSIUM SERPL-SCNC: 4.2 MMOL/L (ref 3.5–5.3)
PROT SERPL-MCNC: 6.5 G/DL (ref 6.4–8.4)
RBC # BLD AUTO: 4.28 MILLION/UL (ref 3.81–5.12)
SODIUM SERPL-SCNC: 138 MMOL/L (ref 135–147)
TSH SERPL DL<=0.05 MIU/L-ACNC: 2.55 UIU/ML (ref 0.45–4.5)
WBC # BLD AUTO: 9.31 THOUSAND/UL (ref 4.31–10.16)

## 2024-08-22 PROCEDURE — 85025 COMPLETE CBC W/AUTO DIFF WBC: CPT

## 2024-08-22 PROCEDURE — 84443 ASSAY THYROID STIM HORMONE: CPT

## 2024-08-22 PROCEDURE — 36415 COLL VENOUS BLD VENIPUNCTURE: CPT

## 2024-08-22 PROCEDURE — 99285 EMERGENCY DEPT VISIT HI MDM: CPT

## 2024-08-22 PROCEDURE — 85049 AUTOMATED PLATELET COUNT: CPT

## 2024-08-22 PROCEDURE — 99223 1ST HOSP IP/OBS HIGH 75: CPT | Performed by: HOSPITALIST

## 2024-08-22 PROCEDURE — 93005 ELECTROCARDIOGRAM TRACING: CPT

## 2024-08-22 PROCEDURE — 84484 ASSAY OF TROPONIN QUANT: CPT

## 2024-08-22 PROCEDURE — 71045 X-RAY EXAM CHEST 1 VIEW: CPT

## 2024-08-22 PROCEDURE — 80053 COMPREHEN METABOLIC PANEL: CPT

## 2024-08-22 RX ORDER — CHLORAL HYDRATE 500 MG
2000 CAPSULE ORAL DAILY
Status: CANCELLED | OUTPATIENT
Start: 2024-08-23

## 2024-08-22 RX ORDER — ASPIRIN 325 MG
325 TABLET ORAL ONCE
Status: COMPLETED | OUTPATIENT
Start: 2024-08-22 | End: 2024-08-22

## 2024-08-22 RX ORDER — LOSARTAN POTASSIUM 50 MG/1
50 TABLET ORAL DAILY
Status: DISCONTINUED | OUTPATIENT
Start: 2024-08-23 | End: 2024-08-23 | Stop reason: HOSPADM

## 2024-08-22 RX ORDER — CHLORAL HYDRATE 500 MG
1000 CAPSULE ORAL DAILY
Status: CANCELLED | OUTPATIENT
Start: 2024-08-23

## 2024-08-22 RX ORDER — LOSARTAN POTASSIUM 25 MG/1
25 TABLET ORAL
Status: DISCONTINUED | OUTPATIENT
Start: 2024-08-22 | End: 2024-08-23 | Stop reason: HOSPADM

## 2024-08-22 RX ORDER — ENOXAPARIN SODIUM 100 MG/ML
40 INJECTION SUBCUTANEOUS DAILY
Status: DISCONTINUED | OUTPATIENT
Start: 2024-08-23 | End: 2024-08-23 | Stop reason: HOSPADM

## 2024-08-22 RX ORDER — PRIMIDONE 50 MG/1
75 TABLET ORAL
Status: DISCONTINUED | OUTPATIENT
Start: 2024-08-22 | End: 2024-08-23 | Stop reason: HOSPADM

## 2024-08-22 RX ADMIN — LOSARTAN POTASSIUM 25 MG: 25 TABLET, FILM COATED ORAL at 21:31

## 2024-08-22 RX ADMIN — Medication 30 MG: at 20:54

## 2024-08-22 RX ADMIN — ASPIRIN 325 MG ORAL TABLET 325 MG: 325 PILL ORAL at 15:12

## 2024-08-22 RX ADMIN — PRIMIDONE 75 MG: 50 TABLET ORAL at 21:32

## 2024-08-22 NOTE — ASSESSMENT & PLAN NOTE
Chronic baseline     continue home medication of 75 mg primidone at bedtime  Patient follows with neurology in the outpatient setting

## 2024-08-22 NOTE — ASSESSMENT & PLAN NOTE
Patient has chronic hypertension and usually takes 50 mg lisinopril in the morning and 25 mg at night.  Was told by PCP that she can take 50 at night if her blood pressure is too high.  She noticed it was elevated prior to arrival at 190/100.  At the time she is experiencing chest pain which prompted her to check her blood pressure.  On arrival her blood pressure was 170/100.    Plan  Will provide with home dose of losartan 25 mg tonight  Reassess blood pressure after patient has been resting  May consider increasing home regimen to 50 mg losartan twice daily.

## 2024-08-22 NOTE — ASSESSMENT & PLAN NOTE
Patient noted to be have pulmonary venous congestion on chest x-ray.  Patient has chronic intermittent cough with some very mild yellow productive sputum.  This does not bother her and she denies any shortness of breath.  Patient is denying any new orthopnea, lower extremity edema, or any new pulmonary symptoms.    Plan  Patient denying any shortness or cough at this time will order echo and reassess symptoms

## 2024-08-22 NOTE — ASSESSMENT & PLAN NOTE
New chest pain that began yesterday.  Patient associates worsening with exertion.  Describes it as chest pressure but no shortness of breath or new cough.  On arrival patient EKG shows new left bundle branch block with normal sinus rhythm.  Initially hypertensive at 170/100.  Initial 2 troponins are negative at 10, 12.  Considering new symptoms we will admit patient for observation.  Chest x-ray shows mild pulmonary congestion.  Last echo was in 2021, showed ejection fraction of 60 to 65%.  Patient loaded with 325 mg aspirin on arrival to ED    Plan  Continue to trend troponins  Serial EKGs as needed for chest pain  Telemetry  Echo  TSH with reflex

## 2024-08-22 NOTE — ED PROVIDER NOTES
History  Chief Complaint   Patient presents with    Chest Pain     Midsternal chest pressure since yesterday. Reports no cardiac hx but reports HTN this morning.      83-year-old female presents today with concerns of chest heaviness/pressure since last evening.  No cardiac history.  No history of MI.  Denies shortness of breath.  States this is unchanged by eating or defecating.  It is made worse by exertion.  She denies any shortness of breath however.  Denies any nausea, vomiting.  No radiation of this pain into the left arm to the left jaw.  No back pain that is worse than her normal.  Denies any urinary symptoms.  No fevers or chills.        Prior to Admission Medications   Prescriptions Last Dose Informant Patient Reported? Taking?   ALPRAZolam (XANAX) 0.25 mg tablet  Self No No   Sig: Take 8 tablets (2 mg total) by mouth daily at bedtime as needed for anxiety (2 hours prio to MRI, repeat if needed prior to exam. Can't drive)   B Complex Vitamins (B COMPLEX 1 PO)  Self Yes No   Sig: Take 1 tablet by mouth   Coenzyme Q10 10 MG capsule  Self Yes No   Sig: Take 10 mg by mouth   Cyanocobalamin (VITAMIN B 12) 100 MCG LOZG  Self Yes No   Sig: Take 1 tablet by mouth   Glucosamine-Chondroit-Calcium (TRIPLE FLEX BONE & JOINT PO)  Self Yes No   Sig: Take by mouth   Patient not taking: Reported on 10/9/2023   Multiple Vitamins-Minerals (MULTIVITAMIN ADULT EXTRA C PO)  Self Yes No   Sig: Take 1 tablet by mouth   Omega-3 Fatty Acids (FISH OIL PO)  Self Yes No   Sig: Take 2 g by mouth   Probiotic Product (PROBIOTIC-10 PO)  Self Yes No   Sig: Take by mouth   chlorhexidine (PERIDEX) 0.12 % solution  Self Yes No   cholecalciferol (VITAMIN D3) 1,000 units tablet  Self Yes No   Sig: Take 1,000 Units by mouth   estradiol-norethindrone (COMBIPATCH) 0.05-0.14 MG/DAY  Self No No   Sig: Place 1 patch on the skin 2 (two) times a week   fish oil-omega-3 fatty acids 1000 MG capsule  Self Yes No   Sig: Take 1 g by mouth   fluticasone  (FLONASE) 50 mcg/act nasal spray  Self No No   Si spray into each nostril daily   losartan (COZAAR) 25 mg tablet  Self Yes No   Si mg daily in addition to 50 mg daily for a total of 75 mg daily   losartan (COZAAR) 50 mg tablet  Self Yes No   Sig: TAKE ONE TABLET BY MOUTH EVERY DAY IN ADDITION TO 25MG TABLET FOR A TOTAL DOSE OF 75MG   primidone (MYSOLINE) 50 mg tablet  Self Yes No   Sig: Take by mouth every 8 (eight) hours   primidone (MYSOLINE) 50 mg tablet   No No   Sig: Take 1.5 tablets (75 mg total) by mouth daily at bedtime      Facility-Administered Medications: None       Past Medical History:   Diagnosis Date    Arthritis     Essential tremor     History of colon polyps     Hx of lithotripsy     Hypertension     Nocturia     Osteopenia     Osteoporosis        Past Surgical History:   Procedure Laterality Date    BREAST BIOPSY Right     excisional, negative    FOOT SURGERY      LITHOTRIPSY      renal    AZ COLONOSCOPY FLX DX W/COLLJ SPEC WHEN PFRMD N/A 2018    Procedure: COLONOSCOPY with polypectomy;  Surgeon: Luna Rowley MD;  Location: [facility];  Service: General       Family History   Problem Relation Age of Onset    Cerebral aneurysm Mother     Heart disease Father     Hypertension Father     Breast cancer Sister 44    Parkinsonism Sister     Parkinsonism Sister     Parkinsonism Sister     No Known Problems Daughter     No Known Problems Maternal Grandmother     No Known Problems Maternal Grandfather     No Known Problems Paternal Grandmother     No Known Problems Paternal Grandfather     Cancer Brother 65        gastric,prostate    Prostate cancer Brother 64    Parkinsonism Brother     Parkinsonism Brother     Breast cancer Paternal Aunt         unknown age    No Known Problems Paternal Aunt     No Known Problems Paternal Aunt     No Known Problems Paternal Aunt      I have reviewed and agree with the history as documented.    E-Cigarette/Vaping    E-Cigarette Use Never User       E-Cigarette/Vaping Substances    Nicotine No     THC No     CBD No     Flavoring No     Other No     Unknown No      Social History     Tobacco Use    Smoking status: Never    Smokeless tobacco: Never   Vaping Use    Vaping status: Never Used   Substance Use Topics    Alcohol use: No    Drug use: No       Review of Systems   Constitutional:  Negative for chills and fever.   HENT:  Negative for ear pain and sore throat.    Eyes:  Negative for pain and visual disturbance.   Respiratory:  Positive for chest tightness. Negative for apnea, cough, choking, shortness of breath, wheezing and stridor.    Cardiovascular:  Positive for chest pain. Negative for palpitations and leg swelling.   Gastrointestinal:  Negative for abdominal pain and vomiting.   Genitourinary:  Negative for dysuria and hematuria.   Musculoskeletal:  Negative for arthralgias and back pain.   Skin:  Negative for color change and rash.   Neurological:  Negative for dizziness, seizures, syncope, weakness, light-headedness and headaches.   All other systems reviewed and are negative.      Physical Exam  Physical Exam  Vitals and nursing note reviewed.   Constitutional:       General: She is not in acute distress.     Appearance: She is well-developed. She is not ill-appearing.   HENT:      Head: Normocephalic and atraumatic.   Eyes:      Conjunctiva/sclera: Conjunctivae normal.   Cardiovascular:      Rate and Rhythm: Normal rate and regular rhythm.      Pulses:           Carotid pulses are 2+ on the right side and 2+ on the left side.       Radial pulses are 2+ on the right side and 2+ on the left side.        Dorsalis pedis pulses are 2+ on the right side and 2+ on the left side.        Posterior tibial pulses are 2+ on the right side and 2+ on the left side.      Heart sounds: Murmur heard.      Systolic murmur is present.   Pulmonary:      Effort: Pulmonary effort is normal. No respiratory distress.      Breath sounds: Normal breath sounds.    Abdominal:      Palpations: Abdomen is soft.      Tenderness: There is no abdominal tenderness.   Musculoskeletal:         General: No swelling.      Cervical back: Neck supple.      Right lower leg: No edema.      Left lower leg: No edema.   Skin:     General: Skin is warm and dry.      Capillary Refill: Capillary refill takes less than 2 seconds.   Neurological:      Mental Status: She is alert.   Psychiatric:         Mood and Affect: Mood normal.         Vital Signs  ED Triage Vitals   Temperature Pulse Respirations Blood Pressure SpO2   08/22/24 1508 08/22/24 1436 08/22/24 1436 08/22/24 1436 08/22/24 1436   97.8 °F (36.6 °C) 71 16 (!) 179/74 97 %      Temp Source Heart Rate Source Patient Position - Orthostatic VS BP Location FiO2 (%)   08/22/24 1508 08/22/24 1436 08/22/24 1436 08/22/24 1436 --   Oral Monitor Sitting Right arm       Pain Score       --                  Vitals:    08/22/24 1436 08/22/24 1600 08/22/24 1700   BP: (!) 179/74 (!) 173/74 (!) 170/104   Pulse: 71 64 65   Patient Position - Orthostatic VS: Sitting Sitting Sitting         Visual Acuity      ED Medications  Medications   aspirin tablet 325 mg (325 mg Oral Given 8/22/24 1512)       Diagnostic Studies  Results Reviewed       Procedure Component Value Units Date/Time    HS Troponin I 2hr [[id number]]  (Normal) Collected: 08/22/24 1703    Lab Status: Final result Specimen: Blood from Arm, Left Updated: 08/22/24 1736     hs TnI 2hr 12 ng/L      Delta 2hr hsTnI 2 ng/L     HS Troponin I 4hr [[id number]]     Lab Status: No result Specimen: Blood     HS Troponin 0hr (reflex protocol) [[id number]]  (Normal) Collected: 08/22/24 1507    Lab Status: Final result Specimen: Blood from Arm, Left Updated: 08/22/24 1545     hs TnI 0hr 10 ng/L     Comprehensive metabolic panel [[id number]] Collected: 08/22/24 1507    Lab Status: Final result Specimen: Blood from Arm, Left Updated: 08/22/24 1532     Sodium 138 mmol/L      Potassium 4.2 mmol/L      Chloride 105  mmol/L      CO2 27 mmol/L      ANION GAP 6 mmol/L      BUN 21 mg/dL      Creatinine 0.97 mg/dL      Glucose 95 mg/dL      Calcium 9.1 mg/dL      AST 22 U/L      ALT 14 U/L      Alkaline Phosphatase 76 U/L      Total Protein 6.5 g/dL      Albumin 4.1 g/dL      Total Bilirubin 0.38 mg/dL      eGFR 54 ml/min/1.73sq m     Narrative:      National Kid[employer] Foundation guidelines for Chronic Kidney Disease (CKD):     Stage 1 with normal or high GFR (GFR > 90 mL/min/1.73 square meters)    Stage 2 Mild CKD (GFR = 60-89 mL/min/1.73 square meters)    Stage 3A Moderate CKD (GFR = 45-59 mL/min/1.73 square meters)    Stage 3B Moderate CKD (GFR = 30-44 mL/min/1.73 square meters)    Stage 4 Severe CKD (GFR = 15-29 mL/min/1.73 square meters)    Stage 5 End Stage CKD (GFR <15 mL/min/1.73 square meters)  Note: GFR calculation is accurate only with a steady state creatinine    CBC and differential [[id number]] Collected: 08/22/24 1507    Lab Status: Final result Specimen: Blood from Arm, Left Updated: 08/22/24 1522     WBC 9.31 Thousand/uL      RBC 4.28 Million/uL      Hemoglobin 13.8 g/dL      Hematocrit 41.5 %      MCV 97 fL      MCH 32.2 pg      MCHC 33.3 g/dL      RDW 13.0 %      MPV 10.0 fL      Platelets 249 Thousands/uL      nRBC 0 /100 WBCs      Segmented % 59 %      Immature Grans % 0 %      Lymphocytes % 32 %      Monocytes % 8 %      Eosinophils Relative 1 %      Basophils Relative 0 %      Absolute Neutrophils 5.40 Thousands/µL      Absolute Immature Grans 0.02 Thousand/uL      Absolute Lymphocytes 2.99 Thousands/µL      Absolute Monocytes 0.75 Thousand/µL      Eosinophils Absolute 0.11 Thousand/µL      Basophils Absolute 0.04 Thousands/µL                    XR chest 1 view portable   ED Interpretation by Alfred Lowe PA-C (08/22 1634)   Increased interstitial lung markings. Suspect lung scarring        Final Result by Danielle Pugh MD (08/22 1708)      Mild pulmonary venous congestion.             Workstation performed: [id number]                    Procedures  ECG 12 Lead Documentation Only    Date/Time: 8/22/2024 4:11 PM    Performed by: Alfred Lowe PA-C  Authorized by: Alfred Lowe PA-C    Indications / Diagnosis:  CP  ECG reviewed by me, the ED Provider: yes    Previous ECG:     Previous ECG:  Compared to current    Similarity:  Changes noted    Comparison to cardiac monitor: Yes    Interpretation:     Interpretation: abnormal    Rate:     ECG rate:  66    ECG rate assessment: normal    Rhythm:     Rhythm: sinus rhythm    Ectopy:     Ectopy: none    QRS:     QRS axis:  Indeterminate    QRS intervals:  Normal  Conduction:     Conduction: abnormal      Abnormal conduction: incomplete LBBB    ST segments:     ST segments:  Normal  T waves:     T waves: normal             ED Course  ED Course as of 08/22/24 1736   Thu Aug 22, 2024   1506 EKG reviewed by me NSR at a rate of 66. New LBBB, per patient's record (no mention of LBBB on priors). No ectopic beats.               HEART Risk Score      Flowsheet Row Most Recent Value   Heart Score Risk Calculator    History 2 Filed at: 08/22/2024 1723   ECG 1 Filed at: 08/22/2024 1723   Age 2 Filed at: 08/22/2024 1723   Risk Factors 1 Filed at: 08/22/2024 1723   Troponin 0 Filed at: 08/22/2024 1723   HEART Score 6 Filed at: 08/22/2024 1723                          SBIRT 20yo+      Flowsheet Row Most Recent Value   Initial Alcohol Screen: US AUDIT-C     1. How often do you have a drink containing alcohol? 0 Filed at: 08/22/2024 1508   2. How many drinks containing alcohol do you have on a typical day you are drinking?  0 Filed at: 08/22/2024 1508   3b. FEMALE Any Age, or MALE 65+: How often do you have 4 or more drinks on one occassion? 0 Filed at: 08/22/2024 1508   Audit-C Score 0 Filed at: 08/22/2024 1508   KRISTIAN: How many times in the past year have you...    Used an illegal drug or used a prescription medication for non-medical reasons? Never Filed at:  08/22/2024 1508                      Medical Decision Making  83-year-old female presents today with concerns of chest pain since yesterday.  No cardiac history.  ACS rule out.  EKG reviewed by me please see above documentation.  Chest x-ray. No fluid overload on exam. No JVD/Edema/Rales.  Plain radiograph(s) were reviewed by me, please see above documentation.  EKG was reviewed by me, please see above documentation.  Heart score 6.  Repeat troponin, first 10.  CBC / CMP wnl.  New heart murmur and new incomplete LBBB.   With constellation of symptoms, and persistently elevated BP, will keep patient overnight for cardiac observation with telemetry.  Patient at time of admission was stable.     Amount and/or Complexity of Data Reviewed  Labs: ordered.  Radiology: ordered and independent interpretation performed.    Risk  OTC drugs.                 Disposition  Final diagnoses:   Heart murmur   Chest pain   HTN (hypertension)   Incomplete left bundle branch block (LBBB)     Time reflects when diagnosis was documented in both MDM as applicable and the Disposition within this note       Time User Action Codes Description Comment    8/22/2024  5:10 PM Alfred Lowe [R01.1] Heart murmur     8/22/2024  5:10 PM Alfred Lowe [R07.9] Chest pain     8/22/2024  5:10 PM Alfred Lowe [I10] HTN (hypertension)     8/22/2024  5:11 PM Alfred Lowe [I44.7] LBBB (left bundle branch block)     8/22/2024  5:12 PM Alfred Lowe Remove [I44.7] LBBB (left bundle branch block)     8/22/2024  5:12 PM Alfred Lowe Add [I44.7] Incomplete left bundle branch block (LBBB)           ED Disposition       ED Disposition   Admit    Condition   Stable    Date/Time   u Aug 22, 2024 2730    Comment   Case was discussed with Dr Coon and the patient's admission status was agreed to be Admission Status: observation status to the service of Dr. Coon .               Follow-up Information       Follow up With Specialties  Details Why Contact Info Additional Information    [facility] Emergency Department Emergency Medicine Go to  If symptoms worsen [address] [facility] [address]  [phone] [facility] Emergency Department, [address] [place], [place], [address]    [facility] Cardiology Schedule an appointment as soon as possible for a visit   [address] [phone] [phone] [facility], [address] [address], [place], [place], [address]   [phone]            Patient's Medications   Discharge Prescriptions    No medications on file       No discharge procedures on file.    PDMP Review         Value Time User    PDMP Reviewed  Yes 11/23/2021  6:47 PM Don Gaffney MD            ED Provider  Electronically Signed by             Alfred Lowe PA-C  08/22/24 1363

## 2024-08-22 NOTE — H&P
[facility]  H&P  Name: Nadja Ramey 83 y.o. female I MRN: [id number]  Unit/Bed#: ED-05 I Date of Admission: 8/22/2024   Date of Service: 8/22/2024 I Hospital Day: 0      Assessment & Plan   * Chest pain  Assessment & Plan  New chest pain that began yesterday.  Patient associates worsening with exertion.  Describes it as chest pressure but no shortness of breath or new cough.  On arrival patient EKG shows new left bundle branch block with normal sinus rhythm.  Initially hypertensive at 170/100.  Initial 2 troponins are negative at 10, 12.  Considering new symptoms we will admit patient for observation.  Chest x-ray shows mild pulmonary congestion.  Last echo was in 2021, showed ejection fraction of 60 to 65%.  Patient loaded with 325 mg aspirin on arrival to ED    Plan  Continue to trend troponins  Serial EKGs as needed for chest pain  Telemetry  Echo  TSH with reflex      Pulmonary venous congestion  Assessment & Plan  Patient noted to be have pulmonary venous congestion on chest x-ray.  Patient has chronic intermittent cough with some very mild yellow productive sputum.  This does not bother her and she denies any shortness of breath.  Patient is denying any new orthopnea, lower extremity edema, or any new pulmonary symptoms.    Plan  Patient denying any shortness or cough at this time will order echo and reassess symptoms    Hypertension  Assessment & Plan  Patient has chronic hypertension and usually takes 50 mg lisinopril in the morning and 25 mg at night.  Was told by PCP that she can take 50 at night if her blood pressure is too high.  She noticed it was elevated prior to arrival at 190/100.  At the time she is experiencing chest pain which prompted her to check her blood pressure.  On arrival her blood pressure was 170/100.    Plan  Will provide with home dose of losartan 25 mg tonight  Reassess blood pressure after patient has been resting  May consider increasing home regimen to  50 mg losartan twice daily.    LBBB (left bundle branch block)  Assessment & Plan  New finding compared to previous EKG in 2021.  Will evaluate patient for new acute cardiac pathology.  If patient is stable can follow-up with cardiology in the outpatient setting.      Plan  Patient on telemetry  Continue to trend troponins  Serial EKGs as needed for chest pain  Echo    Essential tremor  Assessment & Plan  Chronic baseline     continue home medication of 75 mg primidone at bedtime  Patient follows with neurology in the outpatient setting           VTE Pharmacologic Prophylaxis: VTE Score: 8 High Risk (Score >/= 5) - Pharmacological DVT Prophylaxis Ordered: enoxaparin (Lovenox). Sequential Compression Devices Ordered.  Code Status: No Order level 1  Discussion with family: Patient declined call to .     Anticipated Length of Stay: Patient will be admitted on an observation basis with an anticipated length of stay of less than 2 midnights secondary to chest pain.    Chief Complaint: Chest pain    History of Present Illness:  Nadja Ramey is a 83 y.o. female with a PMH of essential tremor, colonic polyps, hypertension, sensorineural hearing loss who presents with complaint of chest pain and pressure that began yesterday around the afternoon.  Patient reports she was working in her yard outside and the pain did get worse with exertion.  Although the pain was ongoing since yesterday she did not notice it until she woke up this morning.  She denies any shortness of breath or worsening cough.  Patient reports she has chronic cough that produces small amount of yellow baseline.  She has spoken with her family doctor about it and they believe it is most likely allergies.  No recent illness and patient has been going about her normal daily activities.  Patient reports she gets around well at home without any assistance but when she wakes up at night to use the bathroom she uses a walker.  No recent falls.   Patient denies any orthopnea or increased lower extremity swelling.  Patient has history of intermittent palpitations but Holter monitor 5 years ago was negative but did show brief runs of atrial tachycardia up to 8 beats.  Previous stress echo done in 2021 was was negative for ischemia and ejection fraction was 60 to 65% at the time.  Patient follows with a neurologist for sensorineural hearing loss and essential tremor, along with significant family history of Parkinson's.  On arrival patient was hypertensive at 170/100.  Patient notes that she was hypertensive prior to arrival when she checked her blood pressure at home due to ongoing chest pressure.  At home she noted it to be 190/100.  Chest x-ray showed mild pulmonary venous congestion.  Initial 2 troponins were negative at 10 then 12.  EKG done on arrival shows left bundle branch block which is new compared to prior in 2021.  Patient is resting comfortably in bed and denying any chest pain at this time.  Discussed with patient being admitted overnight for observation and she is agreeable with plan.    Review of Systems:  Review of Systems   Constitutional:  Negative for activity change, appetite change, chills and fever.   HENT:  Negative for ear pain and sore throat.    Eyes:  Negative for pain and visual disturbance.   Respiratory:  Positive for cough and chest tightness. Negative for shortness of breath.    Cardiovascular:  Negative for chest pain and palpitations.   Gastrointestinal:  Negative for abdominal pain and vomiting.   Genitourinary:  Negative for dysuria and hematuria.   Musculoskeletal:  Negative for arthralgias and back pain.   Skin:  Negative for color change and rash.   Neurological:  Positive for tremors. Negative for dizziness, seizures, syncope and light-headedness.   All other systems reviewed and are negative.      Past Medical and Surgical History:   Past Medical History:   Diagnosis Date    Arthritis     Essential tremor     History  of colon polyps     Hx of lithotripsy     Hypertension     Nocturia     Osteopenia     Osteoporosis        Past Surgical History:   Procedure Laterality Date    BREAST BIOPSY Right 1983    excisional, negative    FOOT SURGERY      LITHOTRIPSY      renal    CO COLONOSCOPY FLX DX W/COLLJ SPEC WHEN PFRMD N/A 9/18/2018    Procedure: COLONOSCOPY with polypectomy;  Surgeon: Luna Rowley MD;  Location: [facility];  Service: General       Meds/Allergies:  Prior to Admission medications    Medication Sig Start Date End Date Taking? Authorizing Provider   ALPRAZolam (XANAX) 0.25 mg tablet Take 8 tablets (2 mg total) by mouth daily at bedtime as needed for anxiety (2 hours prio to MRI, repeat if needed prior to exam. Can't drive) 11/23/21   Don Gaffney MD   B Complex Vitamins (B COMPLEX 1 PO) Take 1 tablet by mouth    Historical Provider, MD   chlorhexidine (PERIDEX) 0.12 % solution     Historical Provider, MD   cholecalciferol (VITAMIN D3) 1,000 units tablet Take 1,000 Units by mouth 8/20/14   Historical Provider, MD   Coenzyme Q10 10 MG capsule Take 10 mg by mouth    Historical Provider, MD   Cyanocobalamin (VITAMIN B 12) 100 MCG LOZG Take 1 tablet by mouth    Historical Provider, MD   estradiol-norethindrone (COMBIPATCH) 0.05-0.14 MG/DAY Place 1 patch on the skin 2 (two) times a week 12/20/18   YEVGENIY Samuels   fish oil-omega-3 fatty acids 1000 MG capsule Take 1 g by mouth    Historical Provider, MD   fluticasone (FLONASE) 50 mcg/act nasal spray 1 spray into each nostril daily 9/29/22   Don Gaffney MD   Glucosamine-Chondroit-Calcium (TRIPLE FLEX BONE & JOINT PO) Take by mouth  Patient not taking: Reported on 10/9/2023    Historical Provider, MD   losartan (COZAAR) 25 mg tablet 25 mg daily in addition to 50 mg daily for a total of 75 mg daily 2/3/21   Historical Provider, MD   losartan (COZAAR) 50 mg tablet TAKE ONE TABLET BY MOUTH EVERY DAY IN ADDITION TO 25MG TABLET FOR A TOTAL DOSE OF 75MG 1/28/21   Historical  Provider, MD   Multiple Vitamins-Minerals (MULTIVITAMIN ADULT EXTRA C PO) Take 1 tablet by mouth    Historical Provider, MD   Omega-3 Fatty Acids (FISH OIL PO) Take 2 g by mouth    Historical Provider, MD   primidone (MYSOLINE) 50 mg tablet Take by mouth every 8 (eight) hours 12/11/20   Historical Provider, MD   primidone (MYSOLINE) 50 mg tablet Take 1.5 tablets (75 mg total) by mouth daily at bedtime 11/27/23   Belinda Caballero MD   Probiotic Product (PROBIOTIC-10 PO) Take by mouth    Historical Provider, MD     I have reviewed home medications with patient personally.    Allergies:   Allergies   Allergen Reactions    Nitroglycerin Anaphylaxis     BP drops    Carboxymethylcellulose Sodium Other (See Comments)     Eyes burn    Nickel Other (See Comments)    Soy Allergy - Food Allergy GI Intolerance    Other Itching and Rash     Metal/ Nickel allergies       Social History:  Marital Status:    Occupation: None  Patient Pre-hospital Living Situation: Home  Patient Pre-hospital Level of Mobility: walks  Patient Pre-hospital Diet Restrictions: None  Substance Use History:   Social History     Substance and Sexual Activity   Alcohol Use No     Social History     Tobacco Use   Smoking Status Never   Smokeless Tobacco Never     Social History     Substance and Sexual Activity   Drug Use No       Family History:  Family History   Problem Relation Age of Onset    Cerebral aneurysm Mother     Heart disease Father     Hypertension Father     Breast cancer Sister 44    Parkinsonism Sister     Parkinsonism Sister     Parkinsonism Sister     No Known Problems Daughter     No Known Problems Maternal Grandmother     No Known Problems Maternal Grandfather     No Known Problems Paternal Grandmother     No Known Problems Paternal Grandfather     Cancer Brother 65        gastric,prostate    Prostate cancer Brother 64    Parkinsonism Brother     Parkinsonism Brother     Breast cancer Paternal Aunt         unknown age    No  Known Problems Paternal Aunt     No Known Problems Paternal Aunt     No Known Problems Paternal Aunt        Physical Exam:     Vitals:   Blood Pressure: (!) 175/74 (08/22/24 1830)  Pulse: 73 (08/22/24 1830)  Temperature: 97.8 °F (36.6 °C) (08/22/24 1508)  Temp Source: Oral (08/22/24 1508)  Respirations: 18 (08/22/24 1830)  SpO2: 98 % (08/22/24 1830)    Physical Exam  Vitals and nursing note reviewed.   Constitutional:       General: She is not in acute distress.     Appearance: She is well-developed.   HENT:      Head: Normocephalic and atraumatic.   Eyes:      Conjunctiva/sclera: Conjunctivae normal.   Cardiovascular:      Rate and Rhythm: Normal rate and regular rhythm.   Pulmonary:      Effort: Pulmonary effort is normal. No respiratory distress.      Breath sounds: Normal breath sounds.   Abdominal:      Palpations: Abdomen is soft.      Tenderness: There is no abdominal tenderness.   Musculoskeletal:         General: No swelling.      Cervical back: Neck supple.      Right lower leg: No edema.      Left lower leg: No edema.   Skin:     General: Skin is warm and dry.      Capillary Refill: Capillary refill takes less than 2 seconds.   Neurological:      Mental Status: She is alert.   Psychiatric:         Mood and Affect: Mood normal.          Additional Data:     Lab Results:  Results from last 7 days   Lab Units 08/22/24  1507   WBC Thousand/uL 9.31   HEMOGLOBIN g/dL 13.8   HEMATOCRIT % 41.5   PLATELETS Thousands/uL 249   SEGS PCT % 59   LYMPHO PCT % 32   MONO PCT % 8   EOS PCT % 1     Results from last 7 days   Lab Units 08/22/24  1507   SODIUM mmol/L 138   POTASSIUM mmol/L 4.2   CHLORIDE mmol/L 105   CO2 mmol/L 27   BUN mg/dL 21   CREATININE mg/dL 0.97   ANION GAP mmol/L 6   CALCIUM mg/dL 9.1   ALBUMIN g/dL 4.1   TOTAL BILIRUBIN mg/dL 0.38   ALK PHOS U/L 76   ALT U/L 14   AST U/L 22   GLUCOSE RANDOM mg/dL 95             Lab Results   Component Value Date    HGBA1C 6.0 (H) 06/11/2024    HGBA1C 5.6 11/28/2023     HGBA1C 5.9 (H) 01/29/2019           Lines/Drains:  Invasive Devices       None                       Imaging: Reviewed radiology reports from this admission including: chest xray  XR chest 1 view portable   ED Interpretation by Alfred Lowe PA-C (08/22 7353)   Increased interstitial lung markings. Suspect lung scarring        Final Result by Danielle Pugh MD (08/22 7840)      Mild pulmonary venous congestion.            Workstation performed: [id number]             EKG and Other Studies Reviewed on Admission:   EKG:   Left bundle branch block.  Normal sinus at a rate of 66.    ** Please Note: This note has been constructed using a voice recognition system. **

## 2024-08-22 NOTE — ASSESSMENT & PLAN NOTE
New finding compared to previous EKG in 2021.  Will evaluate patient for new acute cardiac pathology.  If patient is stable can follow-up with cardiology in the outpatient setting.      Plan  Patient on telemetry  Continue to trend troponins  Serial EKGs as needed for chest pain  Echo

## 2024-08-23 ENCOUNTER — APPOINTMENT (INPATIENT)
Dept: NON INVASIVE DIAGNOSTICS | Facility: HOSPITAL | Age: 84
DRG: 305 | End: 2024-08-23
Payer: COMMERCIAL

## 2024-08-23 VITALS
OXYGEN SATURATION: 98 % | BODY MASS INDEX: 24.83 KG/M2 | TEMPERATURE: 97.5 F | WEIGHT: 149 LBS | HEIGHT: 65 IN | SYSTOLIC BLOOD PRESSURE: 134 MMHG | HEART RATE: 69 BPM | DIASTOLIC BLOOD PRESSURE: 69 MMHG | RESPIRATION RATE: 16 BRPM

## 2024-08-23 LAB
ANION GAP SERPL CALCULATED.3IONS-SCNC: 4 MMOL/L (ref 4–13)
AORTIC ROOT: 2.8 CM
AORTIC VALVE MEAN VELOCITY: 15.3 M/S
ASCENDING AORTA: 2.4 CM
AV LVOT MEAN GRADIENT: 3 MMHG
AV LVOT PEAK GRADIENT: 4 MMHG
AV MEAN GRADIENT: 7 MMHG
AV PEAK GRADIENT: 13 MMHG
AV VALVE AREA: 1.67 CM2
AV VELOCITY RATIO: 0.61
BSA FOR ECHO PROCEDURE: 1.75 M2
BUN SERPL-MCNC: 16 MG/DL (ref 5–25)
CALCIUM SERPL-MCNC: 9 MG/DL (ref 8.4–10.2)
CHLORIDE SERPL-SCNC: 107 MMOL/L (ref 96–108)
CO2 SERPL-SCNC: 30 MMOL/L (ref 21–32)
CREAT SERPL-MCNC: 0.82 MG/DL (ref 0.6–1.3)
DOP CALC AO PEAK VEL: 1.8 M/S
DOP CALC AO VTI: 39 CM
DOP CALC LVOT AREA: 2.83 CM2
DOP CALC LVOT DIAMETER: 1.9 CM
DOP CALC LVOT PEAK VEL VTI: 22.92 CM
DOP CALC LVOT PEAK VEL: 1.1 M/S
DOP CALC LVOT STROKE VOLUME: 64.95 CM3
E WAVE DECELERATION TIME: 335 MS
E/A RATIO: 0.76
ERYTHROCYTE [DISTWIDTH] IN BLOOD BY AUTOMATED COUNT: 13 % (ref 11.6–15.1)
FRACTIONAL SHORTENING: 28 (ref 28–44)
GFR SERPL CREATININE-BSD FRML MDRD: 66 ML/MIN/1.73SQ M
GLUCOSE SERPL-MCNC: 95 MG/DL (ref 65–140)
HCT VFR BLD AUTO: 41.6 % (ref 34.8–46.1)
HGB BLD-MCNC: 13.5 G/DL (ref 11.5–15.4)
INTERVENTRICULAR SEPTUM IN DIASTOLE (PARASTERNAL SHORT AXIS VIEW): 1.3 CM
INTERVENTRICULAR SEPTUM: 1.3 CM (ref 0.6–1.1)
LAAS-AP2: 17.6 CM2
LAAS-AP4: 13.6 CM2
LEFT ATRIUM AREA SYSTOLE SINGLE PLANE A4C: 14.4 CM2
LEFT ATRIUM SIZE: 3.3 CM
LEFT ATRIUM VOLUME (MOD BIPLANE): 43 ML
LEFT ATRIUM VOLUME INDEX (MOD BIPLANE): 24.6 ML/M2
LEFT INTERNAL DIMENSION IN SYSTOLE: 2.9 CM (ref 2.1–4)
LEFT VENTRICULAR INTERNAL DIMENSION IN DIASTOLE: 4 CM (ref 3.5–6)
LEFT VENTRICULAR POSTERIOR WALL IN END DIASTOLE: 1 CM
LEFT VENTRICULAR STROKE VOLUME: 39 ML
LVSV (TEICH): 39 ML
MCH RBC QN AUTO: 31.9 PG (ref 26.8–34.3)
MCHC RBC AUTO-ENTMCNC: 32.5 G/DL (ref 31.4–37.4)
MCV RBC AUTO: 98 FL (ref 82–98)
MV E'TISSUE VEL-SEP: 6 CM/S
MV PEAK A VEL: 1.06 M/S
MV PEAK E VEL: 81 CM/S
MV STENOSIS PRESSURE HALF TIME: 97 MS
MV VALVE AREA P 1/2 METHOD: 2.27
PLATELET # BLD AUTO: 241 THOUSANDS/UL (ref 149–390)
PMV BLD AUTO: 10.2 FL (ref 8.9–12.7)
POTASSIUM SERPL-SCNC: 4.4 MMOL/L (ref 3.5–5.3)
RA PRESSURE ESTIMATED: 8 MMHG
RBC # BLD AUTO: 4.23 MILLION/UL (ref 3.81–5.12)
RIGHT ATRIAL 2D VOLUME: 37 ML
RIGHT ATRIUM AREA SYSTOLE A4C: 14.6 CM2
RIGHT VENTRICLE ID DIMENSION: 4 CM
RV PSP: 35 MMHG
SL CV LEFT ATRIUM LENGTH A2C: 5.3 CM
SL CV LV EF: 60
SL CV PED ECHO LEFT VENTRICLE DIASTOLIC VOLUME (MOD BIPLANE) 2D: 70 ML
SL CV PED ECHO LEFT VENTRICLE SYSTOLIC VOLUME (MOD BIPLANE) 2D: 31 ML
SODIUM SERPL-SCNC: 141 MMOL/L (ref 135–147)
TR MAX PG: 27 MMHG
TR PEAK VELOCITY: 2.6 M/S
TRICUSPID ANNULAR PLANE SYSTOLIC EXCURSION: 3 CM
TRICUSPID VALVE PEAK REGURGITATION VELOCITY: 2.61 M/S
WBC # BLD AUTO: 8.48 THOUSAND/UL (ref 4.31–10.16)

## 2024-08-23 PROCEDURE — 93306 TTE W/DOPPLER COMPLETE: CPT

## 2024-08-23 PROCEDURE — 93306 TTE W/DOPPLER COMPLETE: CPT | Performed by: INTERNAL MEDICINE

## 2024-08-23 PROCEDURE — 85027 COMPLETE CBC AUTOMATED: CPT

## 2024-08-23 PROCEDURE — 80048 BASIC METABOLIC PNL TOTAL CA: CPT

## 2024-08-23 PROCEDURE — 99239 HOSP IP/OBS DSCHRG MGMT >30: CPT | Performed by: INTERNAL MEDICINE

## 2024-08-23 RX ADMIN — VITAM B12 100 MCG: 100 TAB at 09:28

## 2024-08-23 RX ADMIN — ENOXAPARIN SODIUM 40 MG: 40 INJECTION SUBCUTANEOUS at 09:28

## 2024-08-23 RX ADMIN — LOSARTAN POTASSIUM 50 MG: 50 TABLET, FILM COATED ORAL at 09:28

## 2024-08-23 RX ADMIN — Medication 30 MG: at 09:28

## 2024-08-23 RX ADMIN — Medication 1000 UNITS: at 09:28

## 2024-08-23 NOTE — ASSESSMENT & PLAN NOTE
Chronic baseline   Patient follows with neurology in the outpatient setting    Plan;  continue home medication of 75 mg primidone at bedtime

## 2024-08-23 NOTE — ASSESSMENT & PLAN NOTE
"New chest pain that began yesterday.  Patient describes as \"chest pressure\" worse with exertion.  Describes it as chest pressure but no shortness of breath or new cough.  On arrival patient EKG shows new left bundle branch block with normal sinus rhythm.  Initially hypertensive at 170/100.  Serial troponin negative at 10, 12, 11. TSH nl.   Considering new symptoms we will admit patient for observation.    Chest x-ray shows mild pulmonary congestion.  Last echo was in 2021, showed ejection fraction of 60 to 65%, otherwise normal.  Patient loaded with 325 mg aspirin on arrival to ED.   BP now stable in 140's-150's systolic, patient denying recurrence of chest pressure. Telemetry showing occasional PVCs, normal rate & rhythm, incomplete LBBB  ECHO:   Left Ventricle: Left ventricular cavity size is normal. Mildly increased basal septal wall thickness. The left ventricular ejection fraction is 55-60%. Systolic function is normal. Wall motion is normal. Diastolic function is mildly abnormal, consistent with grade I (abnormal) relaxation.    IVS: There is abnormal septal motion consistent with left bundle branch block.    Right Ventricle: Right ventricular cavity size is normal. Systolic function is normal.    Aortic Valve: There is mild stenosis.    Mitral Valve: There is moderate annular calcification. There is mild regurgitation.    Tricuspid Valve: There is mild to moderate regurgitation.    No prior study available for comparison.    Plan  Patient continues without chest pain throughout admission. No acute changes on telemetry. Troponin's were negative. Echo shows sufficient cardiac function. Patient will follow up with cardiology in the outpatient setting and will compelete a nuclear stress test per cardiology. Discussed with patient avoiding exertion until she is cleared by cardiology post nuclear stress test.       "

## 2024-08-23 NOTE — PLAN OF CARE
Problem: PAIN - ADULT  Goal: Verbalizes/displays adequate comfort level or baseline comfort level  Description: Interventions:  - Encourage patient to monitor pain and request assistance  - Assess pain using appropriate pain scale  - Administer analgesics based on type and severity of pain and evaluate response  - Implement non-pharmacological measures as appropriate and evaluate response  - Consider cultural and social influences on pain and pain management  - Notify physician/advanced practitioner if interventions unsuccessful or patient reports new pain  Outcome: Progressing     Problem: INFECTION - ADULT  Goal: Absence or prevention of progression during hospitalization  Description: INTERVENTIONS:  - Assess and monitor for signs and symptoms of infection  - Monitor lab/diagnostic results  - Monitor all insertion sites, i.e. indwelling lines, tubes, and drains  - Monitor endotracheal if appropriate and nasal secretions for changes in amount and color  - [place] appropriate cooling/warming therapies per order  - Administer medications as ordered  - Instruct and encourage patient and family to use good hand hygiene technique  - Identify and instruct in appropriate isolation precautions for identified infection/condition  Outcome: Progressing  Goal: Absence of fever/infection during neutropenic period  Description: INTERVENTIONS:  - Monitor WBC    Outcome: Progressing     Problem: SAFETY ADULT  Goal: Patient will remain free of falls  Description: INTERVENTIONS:  - Educate patient/family on patient safety including physical limitations  - Instruct patient to call for assistance with activity   - Consult OT/PT to assist with strengthening/mobility   - Keep Call bell within reach  - Keep bed low and locked with side rails adjusted as appropriate  - Keep care items and personal belongings within reach  - Initiate and maintain comfort rounds  - Make Fall Risk Sign visible to staff  - Offer Toileting every 2 Hours,  in advance of need  - Initiate/Maintain bed/chair alarm  - Obtain necessary fall risk management equipment  - Apply yellow socks and bracelet for high fall risk patients  - Consider moving patient to room near nurses station  Outcome: Progressing  Goal: Maintain or return to baseline ADL function  Description: INTERVENTIONS:  -  Assess patient's ability to carry out ADLs; assess patient's baseline for ADL function and identify physical deficits which impact ability to perform ADLs (bathing, care of mouth/teeth, toileting, grooming, dressing, etc.)  - Assess/evaluate cause of self-care deficits   - Assess range of motion  - Assess patient's mobility; develop plan if impaired  - Assess patient's need for assistive devices and provide as appropriate  - Encourage maximum independence but intervene and supervise when necessary  - Involve family in performance of ADLs  - Assess for home care needs following discharge   - Consider OT consult to assist with ADL evaluation and planning for discharge  - Provide patient education as appropriate  Outcome: Progressing  Goal: Maintains/Returns to pre admission functional level  Description: INTERVENTIONS:  - Perform AM-PAC 6 Click Basic Mobility/ Daily Activity assessment daily.  - Set and communicate daily mobility goal to care team and patient/family/caregiver.   - Collaborate with rehabilitation services on mobility goals if consulted  - Perform Range of Motion 3 times a day.  - Reposition patient every 2 hours.  - Dangle patient 3 times a day  - Stand patient 3 times a day  - Ambulate patient 3 times a day  - Out of bed to chair 3 times a day   - Out of bed for meals 3 times a day  - Out of bed for toileting  - Record patient progress and toleration of activity level   Outcome: Progressing     Problem: DISCHARGE PLANNING  Goal: Discharge to home or other facility with appropriate resources  Description: INTERVENTIONS:  - Identify barriers to discharge w/patient and  caregiver  - Arrange for needed discharge resources and transportation as appropriate  - Identify discharge learning needs (meds, wound care, etc.)  - Arrange for interpretive services to assist at discharge as needed  - Refer to Case Management Department for coordinating discharge planning if the patient needs post-hospital services based on physician/advanced practitioner order or complex needs related to functional status, cognitive ability, or social support system  Outcome: Progressing     Problem: Knowledge Deficit  Goal: Patient/family/caregiver demonstrates understanding of disease process, treatment plan, medications, and discharge instructions  Description: Complete learning assessment and assess knowledge base.  Interventions:  - Provide teaching at level of understanding  - Provide teaching via preferred learning methods  Outcome: Progressing

## 2024-08-23 NOTE — ASSESSMENT & PLAN NOTE
New finding compared to previous EKG in 2021.    Will evaluate patient for new acute cardiac pathology. Troponin negative, pending ECHO  If patient is stable can follow-up with cardiology in the outpatient setting.      Plan  Patient on telemetry  Serial EKGs as needed for chest pain  ECHO

## 2024-08-23 NOTE — DISCHARGE SUMMARY
"[facility]  Discharge- Nadja Ramey 1940, 83 y.o. female MRN: [id number]  Unit/Bed#: S -01 Encounter: [id number]  Primary Care Provider: Remberto Dee MD   Date and time admitted to hospital: 8/22/2024  2:31 PM    * Chest pain  Assessment & Plan  New chest pain that began yesterday.  Patient describes as \"chest pressure\" worse with exertion.  Describes it as chest pressure but no shortness of breath or new cough.  On arrival patient EKG shows new left bundle branch block with normal sinus rhythm.  Initially hypertensive at 170/100.  Serial troponin negative at 10, 12, 11. TSH nl.   Considering new symptoms we will admit patient for observation.    Chest x-ray shows mild pulmonary congestion.  Last echo was in 2021, showed ejection fraction of 60 to 65%, otherwise normal.  Patient loaded with 325 mg aspirin on arrival to ED.   BP now stable in 140's-150's systolic, patient denying recurrence of chest pressure. Telemetry showing occasional PVCs, normal rate & rhythm, incomplete LBBB  ECHO:   Left Ventricle: Left ventricular cavity size is normal. Mildly increased basal septal wall thickness. The left ventricular ejection fraction is 55-60%. Systolic function is normal. Wall motion is normal. Diastolic function is mildly abnormal, consistent with grade I (abnormal) relaxation.    IVS: There is abnormal septal motion consistent with left bundle branch block.    Right Ventricle: Right ventricular cavity size is normal. Systolic function is normal.    Aortic Valve: There is mild stenosis.    Mitral Valve: There is moderate annular calcification. There is mild regurgitation.    Tricuspid Valve: There is mild to moderate regurgitation.    No prior study available for comparison.    Plan  Patient continues without chest pain throughout admission. No acute changes on telemetry. Troponin's were negative. Echo shows sufficient cardiac function. Patient will follow up with cardiology in " the outpatient setting and will compelete a nuclear stress test per cardiology. Discussed with patient avoiding exertion until she is cleared by cardiology post nuclear stress test.         Pulmonary venous congestion  Assessment & Plan  Patient noted to be have pulmonary venous congestion on chest x-ray.    Patient has chronic intermittent cough with some very mild yellow productive sputum.  This does not bother her and she denies any shortness of breath.    Patient is denying any new orthopnea, lower extremity edema, or any new pulmonary symptoms.    Plan  Patient denying any shortness or cough at this time will order echo and reassess symptoms    Hypertension  Assessment & Plan  Patient has chronic hypertension and usually takes 50 mg lisinopril in the morning and 25 mg at night.   Was told by PCP that she can take 50 at night if her blood pressure is too high.    She noticed it was elevated prior to arrival at 190/100.  At the time she is experiencing chest pain which prompted her to check her blood pressure.  On arrival her blood pressure was 170/100.  BP now stable in 140's-150's systolic, no return of chest pressure since admission    Plan  Continue home dose of losartan 25 mg tonight  Monitor BP  May consider increasing home regimen to 50 mg losartan twice daily.    LBBB (left bundle branch block)  Assessment & Plan  New finding compared to previous EKG in 2021.    Will evaluate patient for new acute cardiac pathology. Troponin negative, pending ECHO  If patient is stable can follow-up with cardiology in the outpatient setting.      Plan  Patient on telemetry  Serial EKGs as needed for chest pain  ECHO    Essential tremor  Assessment & Plan  Chronic baseline   Patient follows with neurology in the outpatient setting    Plan;  continue home medication of 75 mg primidone at bedtime        Medical Problems       Resolved Problems  Date Reviewed: 8/21/2018   None       Discharging Resident: Bernadine Harris  DO  Discharging Attending: Troy Castle MD  PCP: Remberto Dee MD  Admission Date:   Admission Orders (From admission, onward)       Ordered        08/22/24 8125  INPATIENT ADMISSION  Once                          Discharge Date: 08/23/24    Consultations During Hospital Stay:  none    Procedures Performed:   EKG  Echocardiography  Telemetry  troponin    Significant Findings / Test Results:   Echo:   Left Ventricle: Left ventricular cavity size is normal. Mildly increased basal septal wall thickness. The left ventricular ejection fraction is 55-60%. Systolic function is normal. Wall motion is normal. Diastolic function is mildly abnormal, consistent with grade I (abnormal) relaxation.  IVS: There is abnormal septal motion consistent with left bundle branch block.   Aortic Valve: There is mild stenosis.  EKG with lbb     Incidental Findings:   none     Test Results Pending at Discharge (will require follow up):  none     Outpatient Tests Requested:  Nuclear stress test    Complications:  none    Reason for Admission: chest pain    Hospital Course:   Nadja Ramey is a 83 y.o. female  PMH of essential tremor, colonic polyps, hypertension, sensorineural hearing loss who originally presented to the hospital on 8/22/2024 due to complaint of chest pain and pressure that began yesterday around the afternoon. On admission she was hypertensive at 170/100. She originally presented to urgent care who referred her to ED but upon arrival chest pain had resolved and did not reoccur throughout her stay. Initial two troponins were negative at 10, 12. EKG showed lbb but normal sinus at 66. Initial cxr showed mild pulmonary congestion but patient denied any new cough, LE edema, orthopnea, or shortness of breath. Last echo was in 2021 showed normal ef of 60-65%.   Patient was admitted for observation and echo was ordered. Results show ef of 55-60%. Diastolic function is mildly abnormal, consistent with grade I (abnormal)  "relaxation. There is abnormal septal motion consistent with left bundle branch block. Mild aortic stenosis. Mild mitral valve regurgitation.   Patient is stable and ready for discharge. Discussed extensively importance of patient following up with cardiology in the outpatient setting and having nuclear stress test. Explained patient should avoid exertion until she is cleared by cardiology post nuclear stress test. Advised patient to follow up wit pcp within one week of discharge. Follow with pcp and cardiology for echo stress test.     Hospital Course: No notes on file        Please see above list of diagnoses and related plan for additional information.     Condition at Discharge: good    Discharge Day Visit / Exam:   Subjective:  patient evaluated this morning at bedside. She is resting comfortably and had eaten breakfast. She is denying any nausea or vomiting. Patient denies any cp overnight or at this point. No shortness of breath or any other complaints at this time.   Vitals: Blood Pressure: 153/70 (08/23/24 1130)  Pulse: 62 (08/23/24 1130)  Temperature: 97.9 °F (36.6 °C) (08/23/24 0718)  Temp Source: Oral (08/22/24 2009)  Respirations: 18 (08/23/24 0718)  Height: 5' 5\" (165.1 cm) (08/23/24 1130)  Weight - Scale: 67.6 kg (149 lb) (08/23/24 1130)  SpO2: 98 % (08/23/24 0718)  Exam:   Physical Exam  Vitals and nursing note reviewed.   Constitutional:       General: She is not in acute distress.     Appearance: She is well-developed.   HENT:      Head: Normocephalic and atraumatic.   Eyes:      Conjunctiva/sclera: Conjunctivae normal.   Cardiovascular:      Rate and Rhythm: Normal rate and regular rhythm.   Pulmonary:      Effort: Pulmonary effort is normal. No respiratory distress.      Breath sounds: Normal breath sounds.   Abdominal:      Palpations: Abdomen is soft.      Tenderness: There is no abdominal tenderness.   Musculoskeletal:         General: No swelling.      Cervical back: Neck supple.      Right " lower leg: No edema.      Left lower leg: No edema.   Skin:     General: Skin is warm and dry.      Capillary Refill: Capillary refill takes less than 2 seconds.   Neurological:      Mental Status: She is alert.   Psychiatric:         Mood and Affect: Mood normal.        Discussion with Family: Patient declined call to .     Discharge instructions/Information to patient and family:   See after visit summary for information provided to patient and family.      Provisions for Follow-Up Care:  See after visit summary for information related to follow-up care and any pertinent home health orders.      Mobility at time of Discharge:   Basic Mobility Inpatient Raw Score: 24  JH-HLM Goal: 8: Walk 250 feet or more  JH-HLM Achieved: 8: Walk 250 feet ot more  HLM Goal achieved. Continue to encourage appropriate mobility.     Disposition:   Home    Planned Readmission: no    Discharge Medications:  See after visit summary for reconciled discharge medications provided to patient and/or family.      **Please Note: This note may have been constructed using a voice recognition system**

## 2024-08-23 NOTE — ASSESSMENT & PLAN NOTE
Patient has chronic hypertension and usually takes 50 mg lisinopril in the morning and 25 mg at night.   Was told by PCP that she can take 50 at night if her blood pressure is too high.    She noticed it was elevated prior to arrival at 190/100.  At the time she is experiencing chest pain which prompted her to check her blood pressure.  On arrival her blood pressure was 170/100.  BP now stable in 140's-150's systolic, no return of chest pressure since admission    Plan  Continue home dose of losartan 25 mg tonight  Monitor BP  May consider increasing home regimen to 50 mg losartan twice daily.

## 2024-08-23 NOTE — UTILIZATION REVIEW
Initial Clinical Review    Admission: Date/Time/Statement:   Admission Orders (From admission, onward)       Ordered        08/22/24 1735  INPATIENT ADMISSION  Once                          Orders Placed This Encounter   Procedures    INPATIENT ADMISSION     Standing Status:   Standing     Number of Occurrences:   1     Order Specific Question:   Level of Care     Answer:   Med Surg [16]     Order Specific Question:   Estimated length of stay     Answer:   More than 2 Midnights     Order Specific Question:   Certification     Answer:   I certify that inpatient services are medically necessary for this patient for a duration of greater than two midnights. See H&P and MD Progress Notes for additional information about the patient's course of treatment.     ED Arrival Information       Expected   -    Arrival   8/22/2024 14:20    Acuity   Urgent              Means of arrival   Walk-In    Escorted by   Self    Service   Hospitalist    Admission type   Emergency              Arrival complaint   Chest Pressure             Chief Complaint   Patient presents with    Chest Pain     Midsternal chest pressure since yesterday. Reports no cardiac hx but reports HTN this morning.        Initial Presentation: 83 y.o. female  to ED via walk in from home.    Admitted to inpatient with Dx: Chest Pain/Pulmonary venous congestion.  Presented to ED with chest heaviness and pressure starting the evening prior to arrival.  Worse with exertion.  . PMHx:   essential tremor, colonic polyps, hypertension, sensorineural hearing loss. On exam: systolic murmur.  Hs tnl 10>12/2.  Imaging shows mild pulmonary venous congestion on CxR.  Ecg sinus with new incomplete LBBB per ED read.  Heart Score 6.   ED treatment: given asa 325 mg.    Plan includes telemetry, trend troponin. Check echo.  Resume home antihypertensives.  Continue home primidone. .       Date: 8/23/24   Day 2:  chest pressure has resolved prior to arrival in ED.  At home systolic BP  to 190.   On exam: no focal deficits.   Continue to trend troponin.   Echo.  Telemetry.  Monitor BP    ED Triage Vitals   Temperature Pulse Respirations Blood Pressure SpO2 Pain Score   08/22/24 1508 08/22/24 1436 08/22/24 1436 08/22/24 1436 08/22/24 1436 08/22/24 1818   97.8 °F (36.6 °C) 71 16 (!) 179/74 97 % No Pain     Weight (last 2 days)       Date/Time Weight    08/23/24 1130 67.6 (149)            Vital Signs (last 3 days)       Date/Time Temp Pulse Resp BP MAP (mmHg) SpO2 O2 Device Patient Position - Orthostatic VS Merrill Coma Scale Score Pain    08/23/24 1130 -- 62 -- 153/70 -- -- -- -- -- --    08/23/24 0928 -- -- -- -- -- -- None (Room air) -- 15 No Pain    08/23/24 07:18:11 97.9 °F (36.6 °C) 62 18 153/70 98 98 % -- -- -- --    08/22/24 22:49:07 97.9 °F (36.6 °C) 59 17 142/62 89 96 % -- -- -- --    08/22/24 2137 -- 68 -- -- -- 96 % -- -- -- --    08/22/24 2136 -- -- -- 118/63 -- -- -- -- -- --    08/22/24 2035 -- -- -- -- -- -- -- -- 15 --    08/22/24 2009 98.2 °F (36.8 °C) -- -- 144/48 96 -- -- -- -- No Pain    08/22/24 1830 -- 73 18 175/74 106 98 % None (Room air) Sitting -- --    08/22/24 1818 -- -- -- -- -- -- -- -- -- No Pain    08/22/24 1800 -- 68 18 135/63 90 97 % None (Room air) Sitting -- --    08/22/24 1730 -- 68 18 154/72 103 98 % None (Room air) Sitting -- --    08/22/24 1711 -- -- -- -- -- -- None (Room air) -- 15 --    08/22/24 1700 -- 65 16 170/104 131 99 % None (Room air) Sitting -- --    08/22/24 1600 -- 64 16 173/74 106 98 % None (Room air) Sitting -- --    08/22/24 1508 97.8 °F (36.6 °C) -- -- -- -- -- -- -- -- --    08/22/24 1436 -- 71 16 179/74 -- 97 % None (Room air) Sitting -- --            Pertinent Labs/Diagnostic Test Results:   Radiology:  XR chest 1 view portable   ED Interpretation by Alfred Lowe PA-C (08/22 1634)   Increased interstitial lung markings. Suspect lung scarring        Final Interpretation by Danielle Pugh MD (08/22 1708)      Mild pulmonary venous  congestion.            Workstation performed: [id number]           Cardiology:  Echo complete w/ contrast if indicated   Final Result by Kathy Galvin MD (08/23 1406)        Left Ventricle: Left ventricular cavity size is normal. Mildly    increased basal septal wall thickness. The left ventricular ejection    fraction is 55-60%. Systolic function is normal. Wall motion is normal.    Diastolic function is mildly abnormal, consistent with grade I (abnormal)    relaxation.     IVS: There is abnormal septal motion consistent with left bundle branch    block.     Right Ventricle: Right ventricular cavity size is normal. Systolic    function is normal.     Aortic Valve: There is mild stenosis.     Mitral Valve: There is moderate annular calcification. There is mild    regurgitation.     Tricuspid Valve: There is mild to moderate regurgitation.     No prior study available for comparison.         Date/Time: 8/22/2024 4:11 PM  Indications / Diagnosis:  CP  ECG reviewed by me, the ED Provider: yes    Previous ECG:     Previous ECG:  Compared to current    Similarity:  Changes noted    Comparison to cardiac monitor: Yes    Interpretation:     Interpretation: abnormal    Rate:     ECG rate:  66    ECG rate assessment: normal    Rhythm:     Rhythm: sinus rhythm    Ectopy:     Ectopy: none    QRS:     QRS axis:  Indeterminate    QRS intervals:  Normal  Conduction:     Conduction: abnormal      Abnormal conduction: incomplete LBBB    ST segments:     ST segments:  Normal  T waves:     T waves: normal        Results from last 7 days   Lab Units 08/23/24  0536 08/22/24  2208 08/22/24  1507   WBC Thousand/uL 8.48  --  9.31   HEMOGLOBIN g/dL 13.5  --  13.8   HEMATOCRIT % 41.6  --  41.5   PLATELETS Thousands/uL 241 271 249   TOTAL NEUT ABS Thousands/µL  --   --  5.40     Results from last 7 days   Lab Units 08/23/24  0536 08/22/24  1507   SODIUM mmol/L 141 138   POTASSIUM mmol/L 4.4 4.2   CHLORIDE mmol/L 107 105   CO2 mmol/L 30 27    ANION GAP mmol/L 4 6   BUN mg/dL 16 21   CREATININE mg/dL 0.82 0.97   EGFR ml/min/1.73sq m 66 54   CALCIUM mg/dL 9.0 9.1     Results from last 7 days   Lab Units 08/22/24  1507   AST U/L 22   ALT U/L 14   ALK PHOS U/L 76   TOTAL PROTEIN g/dL 6.5   ALBUMIN g/dL 4.1   TOTAL BILIRUBIN mg/dL 0.38     Results from last 7 days   Lab Units 08/23/24  0536 08/22/24  1507   GLUCOSE RANDOM mg/dL 95 95     Results from last 7 days   Lab Units 08/22/24  1854 08/22/24  1703 08/22/24  1507   HS TNI 0HR ng/L  --   --  10   HS TNI 2HR ng/L  --  12  --    HSTNI D2 ng/L  --  2  --    HS TNI 4HR ng/L 11  --   --    HSTNI D4 ng/L 1  --   --      Results from last 7 days   Lab Units 08/22/24  2208   TSH 3RD GENERATON uIU/mL 2.546         ED Treatment-Medication Administration from 08/22/2024 1420 to 08/22/2024 2005         Date/Time Order Dose Route Action     08/22/2024 1512 aspirin tablet 325 mg 325 mg Oral Given            Past Medical History:   Diagnosis Date    Arthritis     Essential tremor     History of colon polyps     Hx of lithotripsy     Hypertension     Nocturia     Osteopenia     Osteoporosis      Present on Admission:   Essential tremor      Admitting Diagnosis: Heart murmur [R01.1]  Chest pain [R07.9]  HTN (hypertension) [I10]  Incomplete left bundle branch block (LBBB) [I44.7]  Age/Sex: 83 y.o. female  Admission Orders:  Scheduled Medications:  Cholecalciferol, 1,000 Units, Oral, Daily  co-enzyme Q-10, 30 mg, Oral, Daily  cyanocobalamin, 100 mcg, Oral, Daily  enoxaparin, 40 mg, Subcutaneous, Daily  losartan, 25 mg, Oral, HS  losartan, 50 mg, Oral, Daily  primidone, 75 mg, Oral, HS      Continuous IV Infusions:     PRN Meds:     Telemetry      Network Utilization Review Department  ATTENTION: Please call with any questions or concerns to [phone] and carefully listen to the prompts so that you are directed to the right person. All voicemails are confidential.   For Discharge needs, contact Care Management DC  Support Team at [phone] opt. 2  Send all requests for admission clinical reviews, approved or denied determinations and any other requests to dedicated fax number below belonging to the campus where the patient is receiving treatment. List of dedicated fax numbers for the Facilities:  FACILITY NAME UR FAX NUMBER   ADMISSION DENIALS (Administrative/Medical Necessity) [phone]   DISCHARGE SUPPORT TEAM (NETWORK) [phone]   PARENT CHILD HEALTH (Maternity/NICU/Pediatrics) [phone]   [facility] [phone]   [facility] [phone]   [facility] [phone]   [facility] [phone]   [facility] [phone]   [facility] [phone]   [facility] [phone]   [facility] [phone]   [facility] [phone]   [facility] [phone]   [facility] [phone]   [facility] [phone]

## 2024-08-23 NOTE — DISCHARGE INSTR - AVS FIRST PAGE
Dear Nadja Ramey,     It was our pleasure to care for you here at [facility].  It is our hope that we were always able to exceed the expected standards for your care during your stay.  You were hospitalized due to chest pain.  You were cared for on the fourth floor by Bernadine Harris DO under the service of Troy Castle MD with the [facility] who covers for your primary care physician (PCP), Remberto Dee MD, while you were hospitalized.  If you have any questions or concerns related to this hospitalization, you may contact us at .  For follow up as well as any medication refills, we recommend that you follow up with your primary care physician.  A registered nurse will reach out to you by phone within a few days after your discharge to answer any additional questions that you may have after going home.  However, at this time we provide for you here, the most important instructions / recommendations at discharge:     Notable Medication Adjustments -   Please continue all your home medications.   Testing Required after Discharge -   Nuclear stress test has been ordered. Follow with cardiology for results.  ** Please contact your PCP to request testing orders for any of the testing recommended here **  Important follow up information -   Please follow up with cardiology, you have been provided with an ambulatory referral. Call   [phone]  to schedule an appointment.  Please follow up with your primary care provider within one week of discharge.   Other Instructions -   Please refrain from any exertional activities until cleared by cardiologist post nuclear stress test.   If you develop new or worsening chest pain please contact your primary care provider or return to the emergency department.  Please review this entire after visit summary as additional general instructions including medication list, appointments, activity, diet, any  pertinent wound care, and other additional recommendations from your care team that may be provided for you.      Sincerely,     Bernadine Harris, DO

## 2024-08-23 NOTE — PLAN OF CARE
Problem: PAIN - ADULT  Goal: Verbalizes/displays adequate comfort level or baseline comfort level  Description: Interventions:  - Encourage patient to monitor pain and request assistance  - Assess pain using appropriate pain scale  - Administer analgesics based on type and severity of pain and evaluate response  - Implement non-pharmacological measures as appropriate and evaluate response  - Consider cultural and social influences on pain and pain management  - Notify physician/advanced practitioner if interventions unsuccessful or patient reports new pain  Outcome: Progressing     Goal: Maintain or return to baseline ADL function  Description: INTERVENTIONS:  -  Assess patient's ability to carry out ADLs; assess patient's baseline for ADL function and identify physical deficits which impact ability to perform ADLs (bathing, care of mouth/teeth, toileting, grooming, dressing, etc.)  - Assess/evaluate cause of self-care deficits   - Assess range of motion  - Assess patient's mobility; develop plan if impaired  - Assess patient's need for assistive devices and provide as appropriate  - Encourage maximum independence but intervene and supervise when necessary  - Involve family in performance of ADLs  - Assess for home care needs following discharge   - Consider OT consult to assist with ADL evaluation and planning for discharge  - Provide patient education as appropriate  Outcome: Progressing    Problem: Knowledge Deficit  Goal: Patient/family/caregiver demonstrates understanding of disease process, treatment plan, medications, and discharge instructions  Description: Complete learning assessment and assess knowledge base.  Interventions:  - Provide teaching at level of understanding  - Provide teaching via preferred learning methods  Outcome: Progressing     Problem: DISCHARGE PLANNING  Goal: Discharge to home or other facility with appropriate resources  Description: INTERVENTIONS:  - Identify barriers to discharge  w/patient and caregiver  - Arrange for needed discharge resources and transportation as appropriate  - Identify discharge learning needs (meds, wound care, etc.)  - Arrange for interpretive services to assist at discharge as needed  - Refer to Case Management Department for coordinating discharge planning if the patient needs post-hospital services based on physician/advanced practitioner order or complex needs related to functional status, cognitive ability, or social support system  Outcome: Progressing

## 2024-08-24 LAB
ATRIAL RATE: 66 BPM
ATRIAL RATE: 69 BPM
P AXIS: 73 DEGREES
P AXIS: 73 DEGREES
PR INTERVAL: 156 MS
PR INTERVAL: 158 MS
QRS AXIS: 61 DEGREES
QRS AXIS: 62 DEGREES
QRSD INTERVAL: 114 MS
QRSD INTERVAL: 124 MS
QT INTERVAL: 434 MS
QT INTERVAL: 438 MS
QTC INTERVAL: 459 MS
QTC INTERVAL: 465 MS
T WAVE AXIS: 74 DEGREES
T WAVE AXIS: 75 DEGREES
VENTRICULAR RATE: 66 BPM
VENTRICULAR RATE: 69 BPM

## 2024-08-24 PROCEDURE — 93010 ELECTROCARDIOGRAM REPORT: CPT | Performed by: INTERNAL MEDICINE

## 2024-08-26 NOTE — UTILIZATION REVIEW
NOTIFICATION OF ADMISSION DISCHARGE   This is a Notification of Discharge from [facility]. Please be advised that this patient has been discharge from our facility. Below you will find the admission and discharge date and time including the patient’s disposition.   UTILIZATION REVIEW CONTACT:  Ashtyn Henry  Utilization   Network Utilization Review Department  Phone: [phone] x carefully listen to the prompts. All voicemails are confidential.  Email: [email or web address]     ADMISSION INFORMATION  PRESENTATION DATE: 8/22/2024  2:31 PM  OBERVATION ADMISSION DATE: N/A  INPATIENT ADMISSION DATE: 8/22/24  5:35 PM   DISCHARGE DATE: 8/23/2024  5:50 PM   DISPOSITION:Home/Self Care    Network Utilization Review Department  ATTENTION: Please call with any questions or concerns to [phone] and carefully listen to the prompts so that you are directed to the right person. All voicemails are confidential.   For Discharge needs, contact Care Management DC Support Team at [phone] opt. [phone]  Send all requests for admission clinical reviews, approved or denied determinations and any other requests to dedicated fax number below belonging to the campus where the patient is receiving treatment. List of dedicated fax numbers for the Facilities:  FACILITY NAME UR FAX NUMBER   ADMISSION DENIALS (Administrative/Medical Necessity) [phone]   DISCHARGE SUPPORT TEAM ([facility]) [phone]   PARENT CHILD HEALTH (Maternity/NICU/Pediatrics) [phone]   [facility] [phone]   [facility] [phone]   [facility] [phone]   [facility] [phone]   [facility] [phone]   [facility] [phone]   [facility] [phone]   [facility] [phone]    [facility] [phone]   [facility] [phone]   [facility] [phone]   [facility] [phone]

## 2024-09-05 ENCOUNTER — HOSPITAL ENCOUNTER (OUTPATIENT)
Facility: HOSPITAL | Age: 84
End: 2024-09-05
Payer: COMMERCIAL

## 2024-09-05 ENCOUNTER — HOSPITAL ENCOUNTER (OUTPATIENT)
Dept: NON INVASIVE DIAGNOSTICS | Facility: HOSPITAL | Age: 84
Discharge: HOME | End: 2024-09-05
Payer: COMMERCIAL

## 2024-09-05 VITALS
SYSTOLIC BLOOD PRESSURE: 132 MMHG | WEIGHT: 149 LBS | OXYGEN SATURATION: 98 % | DIASTOLIC BLOOD PRESSURE: 70 MMHG | BODY MASS INDEX: 24.83 KG/M2 | RESPIRATION RATE: 16 BRPM | HEIGHT: 65 IN | HEART RATE: 70 BPM

## 2024-09-05 DIAGNOSIS — I44.7 INCOMPLETE LEFT BUNDLE BRANCH BLOCK (LBBB): ICD-10-CM

## 2024-09-05 DIAGNOSIS — R07.9 CHEST PAIN: ICD-10-CM

## 2024-09-05 LAB
CHEST PAIN STATEMENT: NORMAL
MAX DIASTOLIC BP: 68 MMHG
MAX HR PERCENT: 70 %
MAX HR: 96 BPM
MAX PREDICTED HEART RATE: 137 BPM
NUC STRESS EJECTION FRACTION: 72 %
PROTOCOL NAME: NORMAL
RATE PRESSURE PRODUCT: NORMAL
REASON FOR TERMINATION: NORMAL
SL CV REST NUCLEAR ISOTOPE DOSE: 10.7 MCI
SL CV STRESS NUCLEAR ISOTOPE DOSE: 33 MCI
SL CV STRESS RECOVERY BP: NORMAL MMHG
SL CV STRESS RECOVERY HR: 88 BPM
SL CV STRESS RECOVERY O2 SAT: 99 %
STRESS ANGINA INDEX: 1
STRESS BASELINE BP: NORMAL MMHG
STRESS BASELINE HR: 70 BPM
STRESS O2 SAT REST: 98 %
STRESS PEAK HR: 96 BPM
STRESS POST ESTIMATED WORKLOAD: 1 METS
STRESS POST EXERCISE DUR MIN: 3 MIN
STRESS POST EXERCISE DUR MIN: 3 MIN
STRESS POST EXERCISE DUR SEC: 0 SEC
STRESS POST EXERCISE DUR SEC: 0 SEC
STRESS POST O2 SAT PEAK: 98 %
STRESS POST PEAK BP: 142 MMHG
STRESS POST PEAK HR: 96 BPM
STRESS POST PEAK SYSTOLIC BP: 150 MMHG
STRESS/REST PERFUSION RATIO: 0.95
TARGET HR FORMULA: NORMAL
TEST INDICATION: NORMAL

## 2024-09-05 PROCEDURE — 93018 CV STRESS TEST I&R ONLY: CPT | Performed by: INTERNAL MEDICINE

## 2024-09-05 PROCEDURE — 93016 CV STRESS TEST SUPVJ ONLY: CPT | Performed by: INTERNAL MEDICINE

## 2024-09-05 PROCEDURE — 93017 CV STRESS TEST TRACING ONLY: CPT

## 2024-09-05 PROCEDURE — A9502 TC99M TETROFOSMIN: HCPCS

## 2024-09-05 PROCEDURE — 78452 HT MUSCLE IMAGE SPECT MULT: CPT

## 2024-09-05 PROCEDURE — 78452 HT MUSCLE IMAGE SPECT MULT: CPT | Performed by: INTERNAL MEDICINE

## 2024-09-05 RX ORDER — REGADENOSON 0.08 MG/ML
0.4 INJECTION, SOLUTION INTRAVENOUS ONCE
Status: COMPLETED | OUTPATIENT
Start: 2024-09-05 | End: 2024-09-05

## 2024-09-05 RX ADMIN — REGADENOSON 0.4 MG: 0.08 INJECTION, SOLUTION INTRAVENOUS at 10:25

## 2024-09-09 LAB
CHEST PAIN STATEMENT: NORMAL
MAX DIASTOLIC BP: 68 MMHG
MAX PREDICTED HEART RATE: 137 BPM
PROTOCOL NAME: NORMAL
REASON FOR TERMINATION: NORMAL
STRESS POST EXERCISE DUR MIN: 3 MIN
STRESS POST EXERCISE DUR SEC: 0 SEC
STRESS POST PEAK HR: 96 BPM
STRESS POST PEAK SYSTOLIC BP: 150 MMHG
TARGET HR FORMULA: NORMAL
TEST INDICATION: NORMAL

## 2024-10-29 ENCOUNTER — CONSULT (OUTPATIENT)
Dept: CARDIOLOGY CLINIC | Facility: CLINIC | Age: 84
End: 2024-10-29
Payer: COMMERCIAL

## 2024-10-29 VITALS
SYSTOLIC BLOOD PRESSURE: 122 MMHG | BODY MASS INDEX: 23.44 KG/M2 | DIASTOLIC BLOOD PRESSURE: 60 MMHG | HEART RATE: 62 BPM | HEIGHT: 65 IN | WEIGHT: 140.7 LBS | OXYGEN SATURATION: 95 %

## 2024-10-29 DIAGNOSIS — E78.49 OTHER HYPERLIPIDEMIA: ICD-10-CM

## 2024-10-29 DIAGNOSIS — I44.7 INCOMPLETE LEFT BUNDLE BRANCH BLOCK (LBBB): ICD-10-CM

## 2024-10-29 DIAGNOSIS — R07.9 CHEST PAIN: Primary | ICD-10-CM

## 2024-10-29 DIAGNOSIS — I10 PRIMARY HYPERTENSION: ICD-10-CM

## 2024-10-29 DIAGNOSIS — R06.09 DOE (DYSPNEA ON EXERTION): ICD-10-CM

## 2024-10-29 DIAGNOSIS — I44.7 LBBB (LEFT BUNDLE BRANCH BLOCK): ICD-10-CM

## 2024-10-29 PROCEDURE — 93000 ELECTROCARDIOGRAM COMPLETE: CPT | Performed by: INTERNAL MEDICINE

## 2024-10-29 PROCEDURE — 99204 OFFICE O/P NEW MOD 45 MIN: CPT | Performed by: INTERNAL MEDICINE

## 2024-10-29 NOTE — PROGRESS NOTES
Nadja FARRIS Tanvir  1940  [id number]  [facility] [place]  [address]  [place] [id number] [phone]    1. Chest pain  Ambulatory Referral to Cardiology    POCT ECG    AMB extended holter monitor      2. Incomplete left bundle branch block (LBBB)  Ambulatory Referral to Cardiology      3. Primary hypertension  CANCELED: Lipid Panel with Direct LDL reflex      4. LBBB (left bundle branch block)  AMB extended holter monitor      5. [place] (dyspnea on exertion)        6. Other hyperlipidemia  VAS screening    CANCELED: Lipid Panel with Direct LDL reflex          Discussion/Summary: She presents today for new patient evaluation.  She has had some palpitations as well as concerns about her blood pressure.  Blood pressure today is 122/60 looks well-controlled she is following with a diary will keep a close eye.  Continue salt reduction continue current dose of losartan.  She has some occasional palpitations and a sense that her heart is skipping beats given her underlying left bundle branch block recommend a 1 week Zio patch to gain rhythm/symptom correlation.  Recent stress test was personally reviewed no evidence of ischemia or scar.  Recent echocardiogram shows preserved left ventricular systolic function.  I have ordered a vascular screening.  We talked about lipid management she would like to see what the screening shows before deciding on any further medical therapy.    Interval History: Very pleasant 83-year-old female with a history of hypertension, dyslipidemia, left bundle presents for new patient valuation on behalf of Dr. Dee.  He was in the hospital recently with some palpitations and shortness of breath along with hypertension.  Echo and stress test were performed which were unremarkable.  She is now feeling better she still gets an occasional fluttering in her chest and a sense that her heart is skipping a beat.  She is active around the house she is able to  go grocery shopping do moderate level housework with no exertional limitations.  Is been no exertional chest pain or discomfort.  Breathing has been comfortable.  Denies any lightheadedness dizziness or syncope.  Palpitations occur a few times a day.  They are short-lived.  Denies any lower extremity edema, PND, orthopnea.    Medical Problems       Problem List       Acquired polyneuropathy    Tarsal tunnel syndrome of both lower extremities    History of colon polyps    Overview Signed 8/23/2018 10:03 AM by Dalila Luong MA     Added automatically from request for surgery [id number]         Essential tremor    Diabetic neuropathy (HCC)        Lab Results   Component Value Date    HGBA1C 6.0 (H) 06/11/2024         Chest pain    LBBB (left bundle branch block)    Hypertension    Pulmonary venous congestion    MONTANA (dyspnea on exertion)        Past Medical History:   Diagnosis Date    Arthritis     Essential tremor     History of colon polyps     Hx of lithotripsy     Hypertension     Nocturia     Osteopenia     Osteoporosis      Social History     Socioeconomic History    Marital status:      Spouse name: Not on file    Number of children: Not on file    Years of education: Not on file    Highest education level: Not on file   Occupational History    Not on file   Tobacco Use    Smoking status: Never    Smokeless tobacco: Never   Vaping Use    Vaping status: Never Used   Substance and Sexual Activity    Alcohol use: No    Drug use: No    Sexual activity: Not Currently     Comment: pt declines hiv std testing   Other Topics Concern    Not on file   Social History Narrative    Not on file     Social Determinants of Health     Financial Resource Strain: Not on file   Food Insecurity: Not on file   Transportation Needs: Not on file   Physical Activity: Not on file   Stress: Not on file   Social Connections: Not on file   Intimate Partner Violence: Not on file   Housing Stability: Not on file      Family History    Problem Relation Age of Onset    Cerebral aneurysm Mother     Heart disease Father     Hypertension Father     Breast cancer Sister 44    Parkinsonism Sister     Parkinsonism Sister     Parkinsonism Sister     No Known Problems Daughter     No Known Problems Maternal Grandmother     No Known Problems Maternal Grandfather     No Known Problems Paternal Grandmother     No Known Problems Paternal Grandfather     Cancer Brother 65        gastric,prostate    Prostate cancer Brother 64    Parkinsonism Brother     Parkinsonism Brother     Breast cancer Paternal Aunt         unknown age    No Known Problems Paternal Aunt     No Known Problems Paternal Aunt     No Known Problems Paternal Aunt      Past Surgical History:   Procedure Laterality Date    BREAST BIOPSY Right 1983    excisional, negative    FOOT SURGERY      LITHOTRIPSY      renal    [place] COLONOSCOPY FLX DX W/COLLJ SPEC WHEN PFRMD N/A 9/18/2018    Procedure: COLONOSCOPY with polypectomy;  Surgeon: Luna Rowley MD;  Location: [facility];  Service: General       Current Outpatient Medications:     B Complex Vitamins (B COMPLEX 1 PO), Take 1 tablet by mouth, Disp: , Rfl:     cholecalciferol (VITAMIN D3) 1,000 units tablet, Take 1,000 Units by mouth, Disp: , Rfl:     Coenzyme Q10 10 MG capsule, Take 10 mg by mouth, Disp: , Rfl:     Cyanocobalamin (VITAMIN B 12) 100 MCG LOZG, Take 1 tablet by mouth, Disp: , Rfl:     fish oil-omega-3 fatty acids 1000 MG capsule, Take 1 g by mouth, Disp: , Rfl:     losartan (COZAAR) 25 mg tablet, 25 mg daily in addition to 50 mg daily for a total of 75 mg daily, Disp: , Rfl:     losartan (COZAAR) 50 mg tablet, TAKE ONE TABLET BY MOUTH EVERY DAY IN ADDITION TO 25MG TABLET FOR A TOTAL DOSE OF 75MG, Disp: , Rfl:     primidone (MYSOLINE) 50 mg tablet, Take 1.5 tablets (75 mg total) by mouth daily at bedtime (Patient taking differently: Take 50 mg by mouth daily at bedtime), Disp: 45 tablet, Rfl: 3    Probiotic Product (PROBIOTIC-10 PO),  "Take by mouth, Disp: , Rfl:     ALPRAZolam (XANAX) 0.25 mg tablet, Take 8 tablets (2 mg total) by mouth daily at bedtime as needed for anxiety (2 hours prio to MRI, repeat if needed prior to exam. Can't drive), Disp: 2 tablet, Rfl: 0    chlorhexidine (PERIDEX) 0.12 % solution, , Disp: , Rfl:     estradiol-norethindrone (COMBIPATCH) 0.05-0.14 MG/DAY, Place 1 patch on the skin 2 (two) times a week, Disp: 24 patch, Rfl: 3    fluticasone (FLONASE) 50 mcg/act nasal spray, 1 spray into each nostril daily, Disp: 16 g, Rfl: 1    Glucosamine-Chondroit-Calcium (TRIPLE FLEX BONE & JOINT PO), Take by mouth (Patient not taking: Reported on 10/29/2024), Disp: , Rfl:     Multiple Vitamins-Minerals (MULTIVITAMIN ADULT EXTRA C PO), Take 1 tablet by mouth, Disp: , Rfl:     Omega-3 Fatty Acids (FISH OIL PO), Take 2 g by mouth, Disp: , Rfl:     primidone (MYSOLINE) 50 mg tablet, Take by mouth every 8 (eight) hours, Disp: , Rfl:   Allergies   Allergen Reactions    Nitroglycerin Anaphylaxis     BP drops    Carboxymethylcellulose Sodium Other (See Comments)     Eyes burn    Nickel Other (See Comments)    Soy Allergy - Food Allergy GI Intolerance    Other Itching and Rash     Metal/ Nickel allergies       Labs:     Chemistry        Component Value Date/Time    K 4.4 08/23/2024 0536    K 4.5 06/11/2024 0937     08/23/2024 0536     06/11/2024 0937    CO2 30 08/23/2024 0536    CO2 29 06/11/2024 0937    BUN 16 08/23/2024 0536    BUN 23 06/11/2024 0937    CREATININE 0.82 08/23/2024 0536    CREATININE 0.80 06/11/2024 0937        Component Value Date/Time    CALCIUM 9.0 08/23/2024 0536    CALCIUM 9.3 06/11/2024 0937    ALKPHOS 76 08/22/2024 1507    ALKPHOS 82 06/11/2024 0937    AST 22 08/22/2024 1507    AST 18 06/11/2024 0937    ALT 14 08/22/2024 1507    ALT 12 06/11/2024 0937            No results found for: \"CHOL\"  No results found for: \"HDL\"  No results found for: \"LDLCALC\"  No results found for: \"TRIG\"  No results found for: " "\"CHOLHDL\"    Imaging: No results found.    ECG: Normal sinus rhythm left bundle branch block      Review of Systems   Constitutional: Negative.   HENT: Negative.     Eyes: Negative.    Cardiovascular:  Positive for irregular heartbeat.   Respiratory: Negative.     Endocrine: Negative.    Hematologic/Lymphatic: Negative.    Skin: Negative.    Musculoskeletal: Negative.    Gastrointestinal: Negative.    Genitourinary: Negative.    Neurological: Negative.    Psychiatric/Behavioral: Negative.     All other systems reviewed and are negative.      Vitals:    10/29/24 1051   BP: 122/60   Pulse: 62   SpO2: 95%     Vitals:    10/29/24 1051   Weight: 63.8 kg (140 lb 11.2 oz)     Height: 5' 5\" (165.1 cm)   Body mass index is 23.41 kg/m².    Physical Exam:  Vital signs reviewed.  General appearance:  Appears stated age, alert, well appearing and in no distress  HEENT:  PERRLA, EOMI, no scleral icterus, no conjunctival pallor  NECK:  Supple, No elevated JVP, no thyromegaly, no carotid bruits, no JVD  HEART:  Regular rate and rhythm, normal S1/S2, no S3/S4, no murmur or rub, PMI nondisplaced  LUNGS:  Clear to auscultation bilaterally, no wheezes rales or rhonchi  ABDOMEN:  Soft, non-tender, positive bowel sounds, no rebound or guarding, no organomegaly   EXTREMITIES:  Normal range of motion.  No clubbing or cyanosis. No edema  VASCULAR:  Normal pedal pulses   SKIN: No lesions or rashes on exposed skin  NEURO:  CN II-XII intact, no focal deficits    "

## 2024-10-31 ENCOUNTER — TELEPHONE (OUTPATIENT)
Dept: CARDIOLOGY CLINIC | Facility: CLINIC | Age: 84
End: 2024-10-31

## 2024-10-31 NOTE — TELEPHONE ENCOUNTER
Per list of insurances [facility] does not required a prior auth for cpt code:41167    7 day zio monitor ordered.

## 2024-11-01 ENCOUNTER — HOSPITAL ENCOUNTER (OUTPATIENT)
Dept: NON INVASIVE DIAGNOSTICS | Facility: CLINIC | Age: 84
Discharge: HOME | End: 2024-11-01
Payer: COMMERCIAL

## 2024-11-01 DIAGNOSIS — E78.49 OTHER HYPERLIPIDEMIA: ICD-10-CM

## 2024-11-01 PROCEDURE — 93978 VASCULAR STUDY: CPT | Performed by: SURGERY

## 2024-11-01 PROCEDURE — 93922 UPR/L XTREMITY ART 2 LEVELS: CPT

## 2024-11-01 PROCEDURE — 93880 EXTRACRANIAL BILAT STUDY: CPT | Performed by: SURGERY

## 2024-11-01 PROCEDURE — 93922 UPR/L XTREMITY ART 2 LEVELS: CPT | Performed by: SURGERY

## 2024-11-19 ENCOUNTER — CLINICAL SUPPORT (OUTPATIENT)
Dept: CARDIOLOGY CLINIC | Facility: CLINIC | Age: 84
End: 2024-11-19
Payer: COMMERCIAL

## 2024-11-19 DIAGNOSIS — R07.89 OTHER CHEST PAIN: Primary | ICD-10-CM

## 2024-11-19 DIAGNOSIS — R07.9 CHEST PAIN: ICD-10-CM

## 2024-11-19 DIAGNOSIS — I44.7 LBBB (LEFT BUNDLE BRANCH BLOCK): ICD-10-CM

## 2024-11-19 PROCEDURE — 93244 EXT ECG>48HR<7D REV&INTERPJ: CPT | Performed by: INTERNAL MEDICINE

## 2024-12-16 ENCOUNTER — RESULTS FOLLOW-UP (OUTPATIENT)
Dept: CARDIOLOGY CLINIC | Facility: CLINIC | Age: 84
End: 2024-12-16

## 2025-06-12 ENCOUNTER — OFFICE VISIT (OUTPATIENT)
Dept: CARDIOLOGY CLINIC | Facility: CLINIC | Age: 85
End: 2025-06-12
Payer: COMMERCIAL

## 2025-06-12 VITALS
HEART RATE: 67 BPM | DIASTOLIC BLOOD PRESSURE: 60 MMHG | HEIGHT: 65 IN | OXYGEN SATURATION: 97 % | SYSTOLIC BLOOD PRESSURE: 138 MMHG | WEIGHT: 144 LBS | BODY MASS INDEX: 23.99 KG/M2

## 2025-06-12 DIAGNOSIS — I10 PRIMARY HYPERTENSION: Primary | ICD-10-CM

## 2025-06-12 DIAGNOSIS — I44.7 LBBB (LEFT BUNDLE BRANCH BLOCK): ICD-10-CM

## 2025-06-12 DIAGNOSIS — I65.22 CAROTID STENOSIS, ASYMPTOMATIC, LEFT: ICD-10-CM

## 2025-06-12 DIAGNOSIS — R06.09 DOE (DYSPNEA ON EXERTION): ICD-10-CM

## 2025-06-12 PROCEDURE — 99214 OFFICE O/P EST MOD 30 MIN: CPT | Performed by: INTERNAL MEDICINE

## 2025-06-12 NOTE — PROGRESS NOTES
Nadja FARRIS Tanvir  1940  [id number]  [facility] [place]  [address]  [place] [id number] [phone]    1. Primary hypertension        2. LBBB (left bundle branch block)        3. [place] (dyspnea on exertion)        4. Carotid stenosis, asymptomatic, left  VAS carotid complete study          Discussion/Summary: Overall she is feeling better following her original visit.  Stress test showed no evidence of ischemia.  Echocardiogram was reviewed.  Carotids showed stenosis, we will plan 1 year follow-up this fall.  I talked to her about starting a statin for her lipids she would like to try a more natural approach and is going to take red yeast rice.  No further testing at this point in time follow-up yearly.      Interval History: Very pleasant 83-year-old female with a history of hypertension, dyslipidemia, left bundle presents for new patient valuation on behalf of Dr. Dee.  He was in the hospital recently with some palpitations and shortness of breath along with hypertension.  Echo and stress test were performed which were unremarkable.  She is now feeling better she still gets an occasional fluttering in her chest and a sense that her heart is skipping a beat.  She is active around the house she is able to go grocery shopping do moderate level housework with no exertional limitations.  Is been no exertional chest pain or discomfort.  Breathing has been comfortable.      Overall she has been feeling well functional capacity is good.  Blood pressure on home checks has been well-controlled.  Denies any chest pain, shortness of breath, palpitations, lightheadedness, dizziness, or syncope.  There is been little extreme edema, PND, orthopnea.    Medical Problems       Problem List       Acquired polyneuropathy    Tarsal tunnel syndrome of both lower extremities    History of colon polyps    Overview Signed 8/23/2018 10:03 AM by Dalila Luong MA     Added automatically from  request for surgery [id number]         Essential tremor    Diabetic neuropathy (HCC)      Lab Results   Component Value Date    HGBA1C 5.8 (H) 12/03/2024         Chest pain    LBBB (left bundle branch block)    Hypertension    Pulmonary venous congestion    MON[place]NA (dyspnea on exertion)        Past Medical History:   Diagnosis Date    Arthritis     Essential tremor     History of colon polyps     Hx of lithotripsy     Hypertension     Nocturia     Osteopenia     Osteoporosis      Social History     Socioeconomic History    Marital status:      Spouse name: Not on file    Number of children: Not on file    Years of education: Not on file    Highest education level: Not on file   Occupational History    Not on file   Tobacco Use    Smoking status: Never    Smokeless tobacco: Never   Vaping Use    Vaping status: Never Used   Substance and Sexual Activity    Alcohol use: No    Drug use: No    Sexual activity: Not Currently     Comment: pt declines hiv std testing   Other Topics Concern    Not on file   Social History Narrative    Not on file     Social Drivers of Health     Financial Resource Strain: Not on file   Food Insecurity: Not on file   Transportation Needs: Not on file   Physical Activity: Not on file   Stress: Not on file   Social Connections: Not on file   Intimate Partner Violence: Not on file   Housing Stability: Not on file      Family History   Problem Relation Name Age of Onset    Cerebral aneurysm Mother      Heart disease Father      Hypertension Father      Breast cancer Sister  44    Parkinsonism Sister      Parkinsonism Sister      Parkinsonism Sister      No Known Problems Daughter kostas     No Known Problems Maternal Grandmother      No Known Problems Maternal Grandfather      No Known Problems Paternal Grandmother      No Known Problems Paternal Grandfather      Cancer Brother  65        gastric,prostate    Prostate cancer Brother  64    Parkinsonism Brother      Parkinsonism Brother      Breast  cancer Paternal Aunt          unknown age    No Known Problems Paternal Aunt      No Known Problems Paternal Aunt      No Known Problems Paternal Aunt       Past Surgical History:   Procedure Laterality Date    BREAST BIOPSY Right 1983    excisional, negative    FOOT SURGERY      LITHOTRIPSY      renal    [place] COLONOSCOPY FLX DX W/COLLJ SPEC WHEN PFRMD N/A 9/18/2018    Procedure: COLONOSCOPY with polypectomy;  Surgeon: Luna Rowley MD;  Location: [facility];  Service: General       Current Outpatient Medications:     cholecalciferol (VITAMIN D3) 1,000 units tablet, Take 1,000 Units by mouth every other day, Disp: , Rfl:     Coenzyme Q10 10 MG capsule, Take 10 mg by mouth, Disp: , Rfl:     fish oil-omega-3 fatty acids 1000 MG capsule, Take 1 g by mouth, Disp: , Rfl:     losartan (COZAAR) 25 mg tablet, , Disp: , Rfl:     losartan (COZAAR) 50 mg tablet, , Disp: , Rfl:     primidone (MYSOLINE) 50 mg tablet, Take by mouth every 8 (eight) hours, Disp: , Rfl:     Probiotic Product (PROBIOTIC-10 PO), Take by mouth, Disp: , Rfl:     ALPRAZolam (XANAX) 0.25 mg tablet, Take 8 tablets (2 mg total) by mouth daily at bedtime as needed for anxiety (2 hours prio to MRI, repeat if needed prior to exam. Can't drive), Disp: 2 tablet, Rfl: 0    B Complex Vitamins (B COMPLEX 1 PO), Take 1 tablet by mouth (Patient not taking: Reported on 6/12/2025), Disp: , Rfl:     chlorhexidine (PERIDEX) 0.12 % solution, , Disp: , Rfl:     Cyanocobalamin (VITAMIN B 12) 100 MCG LOZG, Take 1 tablet by mouth (Patient not taking: Reported on 6/12/2025), Disp: , Rfl:     estradiol-norethindrone (COMBIPATCH) 0.05-0.14 MG/DAY, Place 1 patch on the skin 2 (two) times a week, Disp: 24 patch, Rfl: 3    fluticasone (FLONASE) 50 mcg/act nasal spray, 1 spray into each nostril daily, Disp: 16 g, Rfl: 1    Glucosamine-Chondroit-Calcium (TRIPLE FLEX BONE & JOINT PO), Take by mouth (Patient not taking: Reported on 10/29/2024), Disp: , Rfl:     Multiple  "Vitamins-Minerals (MULTIVITAMIN ADULT EXTRA C PO), Take 1 tablet by mouth, Disp: , Rfl:     Omega-3 Fatty Acids (FISH OIL PO), Take 2 g by mouth (Patient not taking: Reported on 6/12/2025), Disp: , Rfl:     primidone (MYSOLINE) 50 mg tablet, Take 1.5 tablets (75 mg total) by mouth daily at bedtime (Patient not taking: Reported on 6/12/2025), Disp: 45 tablet, Rfl: 3  Allergies   Allergen Reactions    Nitroglycerin Anaphylaxis     BP drops    Carboxymethylcellulose Sodium Other (See Comments)     Eyes burn    Nickel Other (See Comments)    Soy Allergy - Food Allergy GI Intolerance    Other Itching and Rash     Metal/ Nickel allergies       Labs:     Chemistry        Component Value Date/Time    K 4.4 12/03/2024 0851     12/03/2024 0851    CO2 31 12/03/2024 0851    BUN 24 12/03/2024 0851    CREATININE 0.81 12/03/2024 0851        Component Value Date/Time    CALCIUM 9.5 12/03/2024 0851    ALKPHOS 77 12/03/2024 0851    AST 19 12/03/2024 0851    ALT 13 12/03/2024 0851            No results found for: \"CHOL\"  No results found for: \"HDL\"  No results found for: \"LDLCALC\"  No results found for: \"TRIG\"  No results found for: \"CHOLHDL\"    Imaging: No results found.    ECG: Normal sinus rhythm left bundle branch block      Review of Systems   Constitutional: Negative.   HENT: Negative.     Eyes: Negative.    Cardiovascular:  Positive for irregular heartbeat.   Respiratory: Negative.     Endocrine: Negative.    Hematologic/Lymphatic: Negative.    Skin: Negative.    Musculoskeletal: Negative.    Gastrointestinal: Negative.    Genitourinary: Negative.    Neurological: Negative.    Psychiatric/Behavioral: Negative.     All other systems reviewed and are negative.      Vitals:    06/12/25 1121   BP: 138/60   Pulse: 67   SpO2: 97%     Vitals:    06/12/25 1121   Weight: 65.3 kg (144 lb)     Height: 5' 5\" (165.1 cm)   Body mass index is 23.96 kg/m².  Physical Exam:  Vital signs reviewed  General:  Alert and cooperative, appears " stated age, no acute distress  HEENT:  PERRLA, EOMI, no scleral icterus, no conjunctival pallor  Neck:  No lymphadenopathy, no thyromegaly, no carotid bruits, no elevated JVP  Heart:  Regular rate and rhythm, normal S1/S2, no S3/S4, no murmur, rubs or gallops.  PMI nondisplaced  Lungs:  Clear to auscultation bilaterally, no wheezes rales or rhonchi  Abdomen:  Soft, non-tender, positive bowel sounds, no rebound or guarding,   no organomegaly   Extremities:  Normal range of motion.  No clubbing, cyanosis or edema   Vascular:  2+ pedal pulses  Skin:  No rashes or lesions on exposed skin  Neurologic:  Cranial nerves II-XII grossly intact without focal deficits  Psych:  Normal mood and affect

## (undated) DEVICE — SINGLE-USE POLYPECTOMY SNARE: Brand: ROTATABLE SNARE